# Patient Record
Sex: MALE | Race: WHITE | NOT HISPANIC OR LATINO | Employment: OTHER | URBAN - METROPOLITAN AREA
[De-identification: names, ages, dates, MRNs, and addresses within clinical notes are randomized per-mention and may not be internally consistent; named-entity substitution may affect disease eponyms.]

---

## 2017-01-03 ENCOUNTER — TELEPHONE (OUTPATIENT)
Dept: SMOKING CESSATION | Facility: CLINIC | Age: 47
End: 2017-01-03

## 2017-01-03 NOTE — TELEPHONE ENCOUNTER
Called pt to make appointment with smoking cessation program. No answer so left message with call back number.

## 2017-01-11 ENCOUNTER — PATIENT MESSAGE (OUTPATIENT)
Dept: FAMILY MEDICINE | Facility: CLINIC | Age: 47
End: 2017-01-11

## 2017-01-11 DIAGNOSIS — D22.9 NUMEROUS MOLES: Primary | ICD-10-CM

## 2017-01-12 NOTE — TELEPHONE ENCOUNTER
Patient requesting referral to Dermatology to have 3 new moles assessed.  Please advise.     Advised patient that he may have to call insurance to help find a provider.        Message sent via [facility].

## 2017-01-19 ENCOUNTER — PATIENT MESSAGE (OUTPATIENT)
Dept: FAMILY MEDICINE | Facility: CLINIC | Age: 47
End: 2017-01-19

## 2017-01-23 ENCOUNTER — TELEPHONE (OUTPATIENT)
Dept: NEUROSURGERY | Facility: CLINIC | Age: 47
End: 2017-01-23

## 2017-01-23 NOTE — TELEPHONE ENCOUNTER
Left voice message advising patient 2 week post op appointment on Thursday 1/26/17 cancelled. This appointment not needed since he did not have surgery. Call back number provided for questions or concerns.

## 2017-02-13 DIAGNOSIS — F41.9 ANXIETY: ICD-10-CM

## 2017-02-13 RX ORDER — ALPRAZOLAM 1 MG/1
TABLET ORAL
Qty: 60 TABLET | Refills: 1 | Status: SHIPPED | OUTPATIENT
Start: 2017-02-13 | End: 2017-04-13 | Stop reason: SDUPTHER

## 2017-02-28 DIAGNOSIS — F41.9 ANXIETY: ICD-10-CM

## 2017-03-01 RX ORDER — CITALOPRAM 20 MG/1
TABLET, FILM COATED ORAL
Qty: 90 TABLET | Refills: 0 | Status: SHIPPED | OUTPATIENT
Start: 2017-03-01 | End: 2017-05-28 | Stop reason: SDUPTHER

## 2017-03-13 ENCOUNTER — INITIAL CONSULT (OUTPATIENT)
Dept: DERMATOLOGY | Facility: CLINIC | Age: 47
End: 2017-03-13
Payer: OTHER GOVERNMENT

## 2017-03-13 VITALS — WEIGHT: 215 LBS | BODY MASS INDEX: 27.6 KG/M2

## 2017-03-13 DIAGNOSIS — L81.4 LENTIGINES: ICD-10-CM

## 2017-03-13 DIAGNOSIS — Z87.898 HISTORY OF ATYPICAL NEVUS: ICD-10-CM

## 2017-03-13 DIAGNOSIS — R20.9 DISTURBANCE OF SKIN SENSATION: ICD-10-CM

## 2017-03-13 DIAGNOSIS — L82.1 SEBORRHEIC KERATOSES: Primary | ICD-10-CM

## 2017-03-13 PROCEDURE — 99212 OFFICE O/P EST SF 10 MIN: CPT | Mod: PBBFAC,PO | Performed by: DERMATOLOGY

## 2017-03-13 PROCEDURE — 11310 SHAVE SKIN LESION 0.5 CM/<: CPT | Mod: PBBFAC,PO | Performed by: DERMATOLOGY

## 2017-03-13 PROCEDURE — 99213 OFFICE O/P EST LOW 20 MIN: CPT | Mod: 25,S$PBB,, | Performed by: DERMATOLOGY

## 2017-03-13 PROCEDURE — 11310 SHAVE SKIN LESION 0.5 CM/<: CPT | Mod: S$PBB,,, | Performed by: DERMATOLOGY

## 2017-03-13 PROCEDURE — 99999 PR PBB SHADOW E&M-EST. PATIENT-LVL II: CPT | Mod: PBBFAC,,, | Performed by: DERMATOLOGY

## 2017-03-13 RX ORDER — NALOXEGOL OXALATE 25 MG/1
25 TABLET, FILM COATED ORAL DAILY
COMMUNITY
Start: 2017-01-23

## 2017-03-13 RX ORDER — CHLORZOXAZONE 750 MG/1
TABLET ORAL
COMMUNITY
Start: 2017-01-20 | End: 2020-07-29

## 2017-03-13 NOTE — LETTER
March 13, 2017      Cristina Reeves MD  [address]  [place] [address]           [place] [facility]  [address] [facility]  [place] [address]  Phone: [phone]  Fax: [phone]          Patient: Moreno Avitia   MR Number: [id number]   YOB: 1970   Date of Visit: 3/13/2017       Dear Dr. Cristina Reeves:    Thank you for referring Moreno Avitia to me for evaluation. Attached you will find relevant portions of my assessment and plan of care.    If you have questions, please do not hesitate to call me. I look forward to following Moreno Avitia along with you.    Sincerely,    Yessy Avalos MD    Enclosure  CC:  No Recipients    If you would like to receive this communication electronically, please contact [email or web address] or [phone] to request more information on [employer] access.    For providers and/or their staff who would like to refer a patient to [facility], please contact us through our one-stop-shop provider referral line, [facility], at [phone].    If you feel you have received this communication in error or would no longer like to receive these types of communications, please e-mail [email or web address]

## 2017-03-13 NOTE — PROGRESS NOTES
"  Subjective:       Patient ID:  Moreno Avitia is a 47 y.o. male who presents for   Chief Complaint   Patient presents with    Mole    Skin Check     UBSE     HPI Comments: History of Present Illness: The patient presents with chief complaint of lesion.  Location: left cheek  Duration: months   Signs/Symptoms: bleeds    Prior treatments: LN2            Mole         Review of Systems   Constitutional: Negative for fever.   Skin: Negative for itching and rash.   Hematologic/Lymphatic: Does not bruise/bleed easily.        Objective:    Physical Exam   Constitutional: He appears well-developed and well-nourished. No distress.   Neurological: He is alert and oriented to person, place, and time. He is not disoriented.   Psychiatric: He has a normal mood and affect.   Skin:   Areas Examined (abnormalities noted in diagram):   Head / Face Inspection Performed  Neck Inspection Performed  Chest / Axilla Inspection Performed  Abdomen Inspection Performed  Back Inspection Performed  RUE Inspected  LUE Inspection Performed                   Diagram Legend         Surgical scar with no sign of atypical nevus recurrence       Erythematous-base heme-crusted tan verrucoid plaque consistent with inflamed seborrheic keratosis        Assessment / Plan:        Seborrheic keratoses  Reassurance  Left cheek  Shave removal performed after verbal consent including risk of infection, scar, recurrence, need for additional treatment of site. Area prepped with alcohol, anesthetized 1% lidocaine with epinephrine. Lesional tissue shaved and cauterized. Lesion defect size 4mm No complications. Dressing applied. Wound care explained. Not sent for path          Lentigines  The "ABCD" rules to observe pigmented lesions were reviewed.      History of atypical nevus  Comments:  mild atypia mid back no recurrence                 One year return  "

## 2017-04-13 DIAGNOSIS — F41.9 ANXIETY: ICD-10-CM

## 2017-04-13 RX ORDER — ALPRAZOLAM 1 MG/1
TABLET ORAL
Qty: 60 TABLET | Refills: 0 | Status: SHIPPED | OUTPATIENT
Start: 2017-04-13 | End: 2017-04-24 | Stop reason: SDUPTHER

## 2017-04-24 ENCOUNTER — OFFICE VISIT (OUTPATIENT)
Dept: FAMILY MEDICINE | Facility: CLINIC | Age: 47
End: 2017-04-24
Payer: OTHER GOVERNMENT

## 2017-04-24 VITALS
OXYGEN SATURATION: 98 % | HEIGHT: 74 IN | TEMPERATURE: 99 F | WEIGHT: 206.56 LBS | DIASTOLIC BLOOD PRESSURE: 80 MMHG | BODY MASS INDEX: 26.51 KG/M2 | HEART RATE: 80 BPM | SYSTOLIC BLOOD PRESSURE: 110 MMHG

## 2017-04-24 DIAGNOSIS — E78.5 HYPERLIPIDEMIA, UNSPECIFIED HYPERLIPIDEMIA TYPE: ICD-10-CM

## 2017-04-24 DIAGNOSIS — F41.9 ANXIETY: ICD-10-CM

## 2017-04-24 PROCEDURE — 99213 OFFICE O/P EST LOW 20 MIN: CPT | Mod: S$PBB,,, | Performed by: FAMILY MEDICINE

## 2017-04-24 PROCEDURE — 99213 OFFICE O/P EST LOW 20 MIN: CPT | Mod: PBBFAC,PO | Performed by: FAMILY MEDICINE

## 2017-04-24 PROCEDURE — 99999 PR PBB SHADOW E&M-EST. PATIENT-LVL III: CPT | Mod: PBBFAC,,, | Performed by: FAMILY MEDICINE

## 2017-04-24 RX ORDER — ATORVASTATIN CALCIUM 80 MG/1
80 TABLET, FILM COATED ORAL DAILY
Qty: 90 TABLET | Refills: 1 | Status: SHIPPED | OUTPATIENT
Start: 2017-04-24 | End: 2017-11-13 | Stop reason: SDUPTHER

## 2017-04-24 RX ORDER — ALPRAZOLAM 1 MG/1
1 TABLET ORAL 2 TIMES DAILY
Qty: 60 TABLET | Refills: 2 | Status: SHIPPED | OUTPATIENT
Start: 2017-04-24 | End: 2017-08-11 | Stop reason: SDUPTHER

## 2017-04-24 NOTE — PROGRESS NOTES
Answers for HPI/ROS submitted by the patient on 4/22/2017   neck pain: Yes, No  unexpected weight change: No  hearing loss: No  rhinorrhea: No  trouble swallowing: No  eye discharge: No  visual disturbance: No  chest tightness: No  wheezing: No  chest pain: No  palpitations: Yes  blood in stool: No  constipation: No  vomiting: No  diarrhea: No  polydipsia: No  polyuria: No  difficulty urinating: No  urgency: No  hematuria: No  joint swelling: Yes  arthralgias: Yes  headaches: No  weakness: No  confusion: No  dysphoric mood: Yes    Answers for HPI/ROS submitted by the patient on 4/22/2017   neck pain: Yes, No  unexpected weight change: No  hearing loss: No  rhinorrhea: No  trouble swallowing: No  eye discharge: No  visual disturbance: No  chest tightness: No  wheezing: No  chest pain: No  palpitations: Yes  blood in stool: No  constipation: No  vomiting: No  diarrhea: No  polydipsia: No  polyuria: No  difficulty urinating: No  urgency: No  hematuria: No  joint swelling: Yes  arthralgias: Yes  headaches: No  weakness: No  confusion: No  dysphoric mood: Yes

## 2017-04-24 NOTE — PROGRESS NOTES
"Subjective:       Patient ID: Moreno Avitia is a 47 y.o. male.    Chief Complaint: Renew Medications and Labs Only    HPI Comments: Patient presents for refills of his Ativan. He is due for refills. He states he is doing well. His wife states he is grumpy when he doesn't take it.    He has hyperlipidemia. He is due for labs. He denies side effects from his medication, lipitor.     Review of Systems   Constitutional: Negative for activity change and unexpected weight change.   HENT: Negative for hearing loss, rhinorrhea and trouble swallowing.    Eyes: Negative for discharge and visual disturbance.   Respiratory: Negative for chest tightness and wheezing.    Cardiovascular: Positive for palpitations. Negative for chest pain.   Gastrointestinal: Negative for blood in stool, constipation, diarrhea and vomiting.   Endocrine: Negative for polydipsia and polyuria.   Genitourinary: Negative for difficulty urinating, hematuria and urgency.   Musculoskeletal: Positive for arthralgias and joint swelling.   Neurological: Negative for weakness and headaches.   Psychiatric/Behavioral: Positive for dysphoric mood. Negative for confusion.       Objective:       Vitals:    04/24/17 1330   BP: 110/80   Pulse: 80   Temp: 98.5 °F (36.9 °C)   TempSrc: Oral   SpO2: 98%   Weight: 93.7 kg (206 lb 9.1 oz)   Height: 6' 2" (1.88 m)       Physical Exam   Constitutional: He is oriented to person, place, and time. He appears well-developed and well-nourished. No distress.   HENT:   Head: Normocephalic and atraumatic.   Eyes: Conjunctivae are normal.   Neck: Normal range of motion. Neck supple. Carotid bruit is not present.   Cardiovascular: Normal rate, regular rhythm and normal heart sounds.  Exam reveals no gallop and no friction rub.    No murmur heard.  Pulmonary/Chest: Effort normal and breath sounds normal. No respiratory distress. He has no wheezes. He has no rales.   Musculoskeletal: He exhibits no edema.   Lymphadenopathy:     He " has no cervical adenopathy.   Neurological: He is alert and oriented to person, place, and time.   Skin: He is not diaphoretic.   Psychiatric: He has a normal mood and affect.       Assessment:       1. Anxiety    2. Hyperlipidemia, unspecified hyperlipidemia type        Plan:       Moreno was seen today for renew medications and labs only.    Diagnoses and all orders for this visit:    Anxiety  -     alprazolam (XANAX) 1 MG tablet; Take 1 tablet (1 mg total) by mouth 2 (two) times daily. For anixiety.    Hyperlipidemia, unspecified hyperlipidemia type  -     atorvastatin (LIPITOR) 80 MG tablet; Take 1 tablet (80 mg total) by mouth once daily.  -     Lipid panel; Future  -     TSH; Future  -     Comprehensive metabolic panel; Future

## 2017-04-25 ENCOUNTER — LAB VISIT (OUTPATIENT)
Dept: LAB | Facility: HOSPITAL | Age: 47
End: 2017-04-25
Attending: FAMILY MEDICINE
Payer: OTHER GOVERNMENT

## 2017-04-25 DIAGNOSIS — E78.5 HYPERLIPIDEMIA, UNSPECIFIED HYPERLIPIDEMIA TYPE: ICD-10-CM

## 2017-04-25 LAB
ALBUMIN SERPL BCP-MCNC: 3.7 G/DL
ALP SERPL-CCNC: 54 U/L
ALT SERPL W/O P-5'-P-CCNC: 17 U/L
ANION GAP SERPL CALC-SCNC: 7 MMOL/L
AST SERPL-CCNC: 17 U/L
BILIRUB SERPL-MCNC: 0.6 MG/DL
BUN SERPL-MCNC: 21 MG/DL
CALCIUM SERPL-MCNC: 9.2 MG/DL
CHLORIDE SERPL-SCNC: 107 MMOL/L
CHOLEST/HDLC SERPL: 5.4 {RATIO}
CO2 SERPL-SCNC: 25 MMOL/L
CREAT SERPL-MCNC: 0.9 MG/DL
EST. GFR  (AFRICAN AMERICAN): >60 ML/MIN/1.73 M^2
EST. GFR  (NON AFRICAN AMERICAN): >60 ML/MIN/1.73 M^2
GLUCOSE SERPL-MCNC: 105 MG/DL
HDL/CHOLESTEROL RATIO: 18.4 %
HDLC SERPL-MCNC: 190 MG/DL
HDLC SERPL-MCNC: 35 MG/DL
LDLC SERPL CALC-MCNC: 116.6 MG/DL
NONHDLC SERPL-MCNC: 155 MG/DL
POTASSIUM SERPL-SCNC: 3.9 MMOL/L
PROT SERPL-MCNC: 6.5 G/DL
SODIUM SERPL-SCNC: 139 MMOL/L
TRIGL SERPL-MCNC: 192 MG/DL
TSH SERPL DL<=0.005 MIU/L-ACNC: 0.6 UIU/ML

## 2017-04-25 PROCEDURE — 36415 COLL VENOUS BLD VENIPUNCTURE: CPT

## 2017-04-25 PROCEDURE — 80053 COMPREHEN METABOLIC PANEL: CPT

## 2017-04-25 PROCEDURE — 84443 ASSAY THYROID STIM HORMONE: CPT

## 2017-04-25 PROCEDURE — 80061 LIPID PANEL: CPT

## 2017-05-05 ENCOUNTER — HOSPITAL ENCOUNTER (OUTPATIENT)
Dept: RADIOLOGY | Facility: HOSPITAL | Age: 47
Discharge: HOME | End: 2017-05-05
Attending: PAIN MEDICINE
Payer: OTHER GOVERNMENT

## 2017-05-05 DIAGNOSIS — M25.519 PAIN IN JOINT, SHOULDER REGION: ICD-10-CM

## 2017-05-05 DIAGNOSIS — M25.519 PAIN IN JOINT, SHOULDER REGION: Primary | ICD-10-CM

## 2017-05-05 PROCEDURE — 73030 X-RAY EXAM OF SHOULDER: CPT | Mod: TC,RT

## 2017-05-05 PROCEDURE — 73030 X-RAY EXAM OF SHOULDER: CPT | Mod: TC,LT

## 2017-05-05 PROCEDURE — 73030 X-RAY EXAM OF SHOULDER: CPT | Mod: 26,LT,, | Performed by: RADIOLOGY

## 2017-05-05 PROCEDURE — 73030 X-RAY EXAM OF SHOULDER: CPT | Mod: 26,RT,, | Performed by: RADIOLOGY

## 2017-05-23 ENCOUNTER — TELEPHONE (OUTPATIENT)
Dept: FAMILY MEDICINE | Facility: CLINIC | Age: 47
End: 2017-05-23

## 2017-05-23 DIAGNOSIS — Z02.71 DISABILITY EXAMINATION: Primary | ICD-10-CM

## 2017-05-23 NOTE — TELEPHONE ENCOUNTER
----- Message from Amanda Mckeon sent at 5/23/2017  9:26 AM CDT -----  Contact: Self  Pt states he needs to be scheduled for a functional capacity test. Pt can be reached @ [phone].

## 2017-05-23 NOTE — TELEPHONE ENCOUNTER
Patient has [employer] disability and has a form from insurance that has to be a functional capacity test . States that he may need a referral and not sure of where to go to have this done. Informed patient that I would send message to provider to see what patient need to do to have this form filled out. Patient verbalized understanding.

## 2017-05-25 ENCOUNTER — PATIENT MESSAGE (OUTPATIENT)
Dept: FAMILY MEDICINE | Facility: CLINIC | Age: 47
End: 2017-05-25

## 2017-05-28 DIAGNOSIS — F41.9 ANXIETY: ICD-10-CM

## 2017-05-29 RX ORDER — CITALOPRAM 20 MG/1
TABLET, FILM COATED ORAL
Qty: 90 TABLET | Refills: 1 | Status: SHIPPED | OUTPATIENT
Start: 2017-05-29 | End: 2017-11-13 | Stop reason: SDUPTHER

## 2017-07-06 DIAGNOSIS — F41.9 ANXIETY: ICD-10-CM

## 2017-07-06 RX ORDER — ALPRAZOLAM 1 MG/1
TABLET ORAL
Qty: 60 TABLET | OUTPATIENT
Start: 2017-07-06

## 2017-08-03 ENCOUNTER — CLINICAL SUPPORT (OUTPATIENT)
Dept: SMOKING CESSATION | Facility: CLINIC | Age: 47
End: 2017-08-03
Payer: COMMERCIAL

## 2017-08-03 DIAGNOSIS — F17.200 NICOTINE DEPENDENCE: Primary | ICD-10-CM

## 2017-08-03 PROCEDURE — 99407 BEHAV CHNG SMOKING > 10 MIN: CPT | Mod: S$GLB,,,

## 2017-08-03 NOTE — PROGRESS NOTES
Pt not ready to return to the program at this time due to hospitalization of son. Pt will call to schedule when ready. Will resolve this episode.

## 2017-08-08 ENCOUNTER — PATIENT MESSAGE (OUTPATIENT)
Dept: FAMILY MEDICINE | Facility: CLINIC | Age: 47
End: 2017-08-08

## 2017-08-08 DIAGNOSIS — F41.9 ANXIETY: ICD-10-CM

## 2017-08-11 ENCOUNTER — OFFICE VISIT (OUTPATIENT)
Dept: FAMILY MEDICINE | Facility: CLINIC | Age: 47
End: 2017-08-11
Payer: OTHER GOVERNMENT

## 2017-08-11 VITALS
WEIGHT: 204.81 LBS | OXYGEN SATURATION: 97 % | HEART RATE: 60 BPM | DIASTOLIC BLOOD PRESSURE: 80 MMHG | SYSTOLIC BLOOD PRESSURE: 110 MMHG | BODY MASS INDEX: 26.28 KG/M2 | HEIGHT: 74 IN

## 2017-08-11 DIAGNOSIS — F41.9 ANXIETY: ICD-10-CM

## 2017-08-11 PROCEDURE — 99999 PR PBB SHADOW E&M-EST. PATIENT-LVL III: CPT | Mod: PBBFAC,,, | Performed by: FAMILY MEDICINE

## 2017-08-11 PROCEDURE — 3008F BODY MASS INDEX DOCD: CPT | Mod: ,,, | Performed by: FAMILY MEDICINE

## 2017-08-11 PROCEDURE — 99213 OFFICE O/P EST LOW 20 MIN: CPT | Mod: PBBFAC,PO | Performed by: FAMILY MEDICINE

## 2017-08-11 PROCEDURE — 99213 OFFICE O/P EST LOW 20 MIN: CPT | Mod: S$PBB,,, | Performed by: FAMILY MEDICINE

## 2017-08-11 RX ORDER — ALPRAZOLAM 1 MG/1
1 TABLET ORAL 2 TIMES DAILY
Qty: 60 TABLET | Refills: 2 | Status: SHIPPED | OUTPATIENT
Start: 2017-08-11 | End: 2017-11-16 | Stop reason: SDUPTHER

## 2017-08-11 RX ORDER — DICLOFENAC SODIUM 16.05 MG/ML
SOLUTION TOPICAL
Refills: 0 | COMMUNITY
Start: 2017-07-07

## 2017-08-11 NOTE — PROGRESS NOTES
"Subjective:       Patient ID: Moreno Avitia is a 47 y.o. male.    Chief Complaint: Renew Meds and Discuss Cholesterol Medication    Patient presents for anxiety. He states his stepson was hospitalized with respiratory failure. He states he is being weaned off the oxygen at home. It has been a strain on him and his family due to the illness. He uses xanax to cope with the stress and uses this as needed. He had this last filled in April 2017.       Review of Systems   Respiratory: Negative for chest tightness and shortness of breath.    Cardiovascular: Negative for chest pain and palpitations.       Objective:       Vitals:    08/11/17 1008   BP: 110/80   Pulse: 60   SpO2: 97%   Weight: 92.9 kg (204 lb 12.9 oz)   Height: 6' 2" (1.88 m)       Physical Exam   Constitutional: He is oriented to person, place, and time. He appears well-developed and well-nourished. No distress.   HENT:   Head: Normocephalic and atraumatic.   Eyes: Conjunctivae are normal.   Neck: Normal range of motion. Neck supple.   Cardiovascular: Normal rate, regular rhythm and normal heart sounds.  Exam reveals no gallop and no friction rub.    No murmur heard.  Pulmonary/Chest: Effort normal and breath sounds normal. No respiratory distress. He has no wheezes. He has no rales.   Neurological: He is alert and oriented to person, place, and time.   Skin: He is not diaphoretic.       Assessment:       1. Anxiety        Plan:       Moreno was seen today for renew meds and discuss cholesterol medication.    Diagnoses and all orders for this visit:    Anxiety  -     alprazolam (XANAX) 1 MG tablet; Take 1 tablet (1 mg total) by mouth 2 (two) times daily. For anixiety.           "

## 2017-09-06 ENCOUNTER — PATIENT MESSAGE (OUTPATIENT)
Dept: ADMINISTRATIVE | Facility: OTHER | Age: 47
End: 2017-09-06

## 2017-09-14 ENCOUNTER — PATIENT MESSAGE (OUTPATIENT)
Dept: FAMILY MEDICINE | Facility: CLINIC | Age: 47
End: 2017-09-14

## 2017-09-14 DIAGNOSIS — M79.2 NEURITIS: ICD-10-CM

## 2017-09-14 DIAGNOSIS — M47.26 OSTEOARTHRITIS OF SPINE WITH RADICULOPATHY, LUMBAR REGION: Primary | ICD-10-CM

## 2017-09-21 ENCOUNTER — TELEPHONE (OUTPATIENT)
Dept: FAMILY MEDICINE | Facility: CLINIC | Age: 47
End: 2017-09-21

## 2017-10-13 DIAGNOSIS — F41.9 ANXIETY: ICD-10-CM

## 2017-10-16 RX ORDER — ALPRAZOLAM 1 MG/1
TABLET ORAL
Qty: 60 TABLET | OUTPATIENT
Start: 2017-10-16

## 2017-11-13 ENCOUNTER — PATIENT MESSAGE (OUTPATIENT)
Dept: FAMILY MEDICINE | Facility: CLINIC | Age: 47
End: 2017-11-13

## 2017-11-13 DIAGNOSIS — F41.9 ANXIETY: ICD-10-CM

## 2017-11-13 DIAGNOSIS — E78.5 HYPERLIPIDEMIA, UNSPECIFIED HYPERLIPIDEMIA TYPE: ICD-10-CM

## 2017-11-13 RX ORDER — ALPRAZOLAM 1 MG/1
1 TABLET ORAL 2 TIMES DAILY
Qty: 60 TABLET | Refills: 2 | OUTPATIENT
Start: 2017-11-13

## 2017-11-14 RX ORDER — ATORVASTATIN CALCIUM 80 MG/1
80 TABLET, FILM COATED ORAL DAILY
Qty: 90 TABLET | Refills: 1 | Status: SHIPPED | OUTPATIENT
Start: 2017-11-14 | End: 2018-04-20 | Stop reason: SDUPTHER

## 2017-11-14 RX ORDER — ALPRAZOLAM 1 MG/1
1 TABLET ORAL 2 TIMES DAILY
Qty: 60 TABLET | Refills: 2 | OUTPATIENT
Start: 2017-11-14

## 2017-11-14 RX ORDER — CITALOPRAM 20 MG/1
20 TABLET, FILM COATED ORAL DAILY
Qty: 90 TABLET | Refills: 1 | Status: SHIPPED | OUTPATIENT
Start: 2017-11-14 | End: 2017-11-25 | Stop reason: SDUPTHER

## 2017-11-14 NOTE — TELEPHONE ENCOUNTER
Patient informed of refill denial - xanax; office visit needed.  Patient verbalized understanding, scheduled appointment for 11/16/2017.

## 2017-11-16 ENCOUNTER — OFFICE VISIT (OUTPATIENT)
Dept: FAMILY MEDICINE | Facility: CLINIC | Age: 47
End: 2017-11-16
Payer: MEDICARE

## 2017-11-16 ENCOUNTER — LAB VISIT (OUTPATIENT)
Dept: LAB | Facility: HOSPITAL | Age: 47
End: 2017-11-16
Attending: FAMILY MEDICINE
Payer: MEDICARE

## 2017-11-16 VITALS
WEIGHT: 205 LBS | OXYGEN SATURATION: 97 % | TEMPERATURE: 98 F | DIASTOLIC BLOOD PRESSURE: 70 MMHG | HEART RATE: 80 BPM | HEIGHT: 74 IN | SYSTOLIC BLOOD PRESSURE: 120 MMHG | BODY MASS INDEX: 26.31 KG/M2

## 2017-11-16 DIAGNOSIS — M54.50 MIDLINE LOW BACK PAIN WITHOUT SCIATICA, UNSPECIFIED CHRONICITY: ICD-10-CM

## 2017-11-16 DIAGNOSIS — E78.5 HYPERLIPIDEMIA, UNSPECIFIED HYPERLIPIDEMIA TYPE: Primary | ICD-10-CM

## 2017-11-16 DIAGNOSIS — G89.4 CHRONIC PAIN SYNDROME: ICD-10-CM

## 2017-11-16 DIAGNOSIS — E78.5 HYPERLIPIDEMIA, UNSPECIFIED HYPERLIPIDEMIA TYPE: ICD-10-CM

## 2017-11-16 DIAGNOSIS — F41.9 ANXIETY: ICD-10-CM

## 2017-11-16 LAB
ALBUMIN SERPL BCP-MCNC: 3.5 G/DL
ALP SERPL-CCNC: 53 U/L
ALT SERPL W/O P-5'-P-CCNC: 12 U/L
ANION GAP SERPL CALC-SCNC: 7 MMOL/L
AST SERPL-CCNC: 17 U/L
BASOPHILS # BLD AUTO: 0.03 K/UL
BASOPHILS NFR BLD: 0.5 %
BILIRUB SERPL-MCNC: 0.4 MG/DL
BUN SERPL-MCNC: 20 MG/DL
BZO OXAZEPAM 300 NG/ML: POSITIVE
CALCIUM SERPL-MCNC: 9.2 MG/DL
CHLORIDE SERPL-SCNC: 109 MMOL/L
CHOLEST SERPL-MCNC: 207 MG/DL
CHOLEST/HDLC SERPL: 5 {RATIO}
CO2 SERPL-SCNC: 26 MMOL/L
CREAT SERPL-MCNC: 0.8 MG/DL
CTP QC/QA: YES
DIFFERENTIAL METHOD: ABNORMAL
EOSINOPHIL # BLD AUTO: 0.4 K/UL
EOSINOPHIL NFR BLD: 6 %
ERYTHROCYTE [DISTWIDTH] IN BLOOD BY AUTOMATED COUNT: 12.6 %
EST. GFR  (AFRICAN AMERICAN): >60 ML/MIN/1.73 M^2
EST. GFR  (NON AFRICAN AMERICAN): >60 ML/MIN/1.73 M^2
GLUCOSE SERPL-MCNC: 105 MG/DL
HCT VFR BLD AUTO: 42.7 %
HDLC SERPL-MCNC: 41 MG/DL
HDLC SERPL: 19.8 %
HGB BLD-MCNC: 14.9 G/DL
LDLC SERPL CALC-MCNC: 137.4 MG/DL
LYMPHOCYTES # BLD AUTO: 1.7 K/UL
LYMPHOCYTES NFR BLD: 27.4 %
MCH RBC QN AUTO: 31.7 PG
MCHC RBC AUTO-ENTMCNC: 34.9 G/DL
MCV RBC AUTO: 91 FL
MONOCYTES # BLD AUTO: 0.6 K/UL
MONOCYTES NFR BLD: 9.4 %
NEUTROPHILS # BLD AUTO: 3.6 K/UL
NEUTROPHILS NFR BLD: 56.7 %
NONHDLC SERPL-MCNC: 166 MG/DL
OXYCODONE,URINE, POC: POSITIVE
PLATELET # BLD AUTO: 248 K/UL
PMV BLD AUTO: 10.9 FL
POC 10 PANEL DRUG SCREEN: NEGATIVE
POTASSIUM SERPL-SCNC: 4.2 MMOL/L
PROT SERPL-MCNC: 6.5 G/DL
RBC # BLD AUTO: 4.7 M/UL
SODIUM SERPL-SCNC: 142 MMOL/L
TRIGL SERPL-MCNC: 143 MG/DL
TSH SERPL DL<=0.005 MIU/L-ACNC: 0.53 UIU/ML
WBC # BLD AUTO: 6.36 K/UL

## 2017-11-16 PROCEDURE — 80305 DRUG TEST PRSMV DIR OPT OBS: CPT | Mod: PBBFAC,PO | Performed by: FAMILY MEDICINE

## 2017-11-16 PROCEDURE — 99999 PR PBB SHADOW E&M-EST. PATIENT-LVL III: CPT | Mod: PBBFAC,,, | Performed by: FAMILY MEDICINE

## 2017-11-16 PROCEDURE — 84443 ASSAY THYROID STIM HORMONE: CPT

## 2017-11-16 PROCEDURE — 80061 LIPID PANEL: CPT

## 2017-11-16 PROCEDURE — 85025 COMPLETE CBC W/AUTO DIFF WBC: CPT

## 2017-11-16 PROCEDURE — 80053 COMPREHEN METABOLIC PANEL: CPT

## 2017-11-16 PROCEDURE — 36415 COLL VENOUS BLD VENIPUNCTURE: CPT

## 2017-11-16 PROCEDURE — 99213 OFFICE O/P EST LOW 20 MIN: CPT | Mod: S$PBB,,, | Performed by: FAMILY MEDICINE

## 2017-11-16 PROCEDURE — 99213 OFFICE O/P EST LOW 20 MIN: CPT | Mod: PBBFAC,PO | Performed by: FAMILY MEDICINE

## 2017-11-16 RX ORDER — ALPRAZOLAM 1 MG/1
1 TABLET ORAL 2 TIMES DAILY
Qty: 60 TABLET | Refills: 2 | Status: SHIPPED | OUTPATIENT
Start: 2017-11-16 | End: 2018-02-15 | Stop reason: SDUPTHER

## 2017-11-16 NOTE — PROGRESS NOTES
"Subjective:       Patient ID: Moreno Avitia is a 47 y.o. male.    Chief Complaint: Renew Meds    Patient presents for followup. He is doing well on his xanax. His mood is stable. He states his step son is doing much better and is eating regularly. He is at home on a ventilator. Things are settling and the stress level is decreasing.       Review of Systems   Respiratory: Negative for chest tightness and shortness of breath.    Cardiovascular: Negative for chest pain and palpitations.   Psychiatric/Behavioral: Negative for agitation, decreased concentration and dysphoric mood. The patient is nervous/anxious.        Objective:       Vitals:    11/16/17 0854   BP: 120/70   Pulse: 80   Temp: 98.3 °F (36.8 °C)   TempSrc: Oral   SpO2: 97%   Weight: 93 kg (205 lb 0.4 oz)   Height: 6' 2" (1.88 m)        Physical Exam   Constitutional: He is oriented to person, place, and time. He appears well-developed and well-nourished. No distress.   HENT:   Head: Normocephalic and atraumatic.   Eyes: Conjunctivae are normal.   Neck: Normal range of motion. Neck supple. Carotid bruit is not present.   Cardiovascular: Normal rate, regular rhythm and normal heart sounds.  Exam reveals no gallop and no friction rub.    No murmur heard.  Pulmonary/Chest: Effort normal and breath sounds normal. No respiratory distress. He has no wheezes. He has no rales. He exhibits no tenderness.   Musculoskeletal: He exhibits no edema.   Lymphadenopathy:     He has no cervical adenopathy.   Neurological: He is alert and oriented to person, place, and time. He displays no tremor.   Skin: He is not diaphoretic.   Psychiatric: He has a normal mood and affect.       Assessment:       1. Hyperlipidemia, unspecified hyperlipidemia type    2. Anxiety    3. Chronic pain syndrome    4. Midline low back pain without sciatica, unspecified chronicity         Plan:       Moreno was seen today for renew meds.    Diagnoses and all orders for this " visit:    Hyperlipidemia, unspecified hyperlipidemia type  -     Lipid panel; Future  -     CBC auto differential; Future  -     Comprehensive metabolic panel; Future  -     TSH; Future  Due for labs    Anxiety  -     ALPRAZolam (XANAX) 1 MG tablet; Take 1 tablet (1 mg total) by mouth 2 (two) times daily. For anixiety.  -     Toxicology screen, urine; Future  Stable. Refilled meds and due for labs    Chronic pain syndrome  -     POCT Rapid Drug Screen 10 Panel    Midline low back pain without sciatica, unspecified chronicity   -     POCT Rapid Drug Screen 10 Panel

## 2017-11-25 DIAGNOSIS — F41.9 ANXIETY: ICD-10-CM

## 2017-11-26 RX ORDER — CITALOPRAM 20 MG/1
TABLET, FILM COATED ORAL
Qty: 90 TABLET | Refills: 1 | Status: SHIPPED | OUTPATIENT
Start: 2017-11-26 | End: 2018-07-09 | Stop reason: SDUPTHER

## 2018-01-15 DIAGNOSIS — F41.9 ANXIETY: ICD-10-CM

## 2018-01-16 RX ORDER — ALPRAZOLAM 1 MG/1
TABLET ORAL
Qty: 60 TABLET | OUTPATIENT
Start: 2018-01-16

## 2018-02-15 ENCOUNTER — OFFICE VISIT (OUTPATIENT)
Dept: FAMILY MEDICINE | Facility: CLINIC | Age: 48
End: 2018-02-15
Payer: MEDICARE

## 2018-02-15 VITALS
HEIGHT: 74 IN | TEMPERATURE: 98 F | HEART RATE: 67 BPM | OXYGEN SATURATION: 97 % | WEIGHT: 203.69 LBS | BODY MASS INDEX: 26.14 KG/M2 | SYSTOLIC BLOOD PRESSURE: 110 MMHG | DIASTOLIC BLOOD PRESSURE: 78 MMHG

## 2018-02-15 DIAGNOSIS — F41.9 ANXIETY: ICD-10-CM

## 2018-02-15 DIAGNOSIS — J20.9 ACUTE BRONCHITIS, UNSPECIFIED ORGANISM: Primary | ICD-10-CM

## 2018-02-15 PROCEDURE — 99999 PR PBB SHADOW E&M-EST. PATIENT-LVL III: CPT | Mod: PBBFAC,,, | Performed by: FAMILY MEDICINE

## 2018-02-15 PROCEDURE — 99213 OFFICE O/P EST LOW 20 MIN: CPT | Mod: PBBFAC,PO | Performed by: FAMILY MEDICINE

## 2018-02-15 PROCEDURE — 99214 OFFICE O/P EST MOD 30 MIN: CPT | Mod: S$PBB,,, | Performed by: FAMILY MEDICINE

## 2018-02-15 RX ORDER — ALPRAZOLAM 1 MG/1
1 TABLET ORAL 2 TIMES DAILY
Qty: 60 TABLET | Refills: 2 | Status: SHIPPED | OUTPATIENT
Start: 2018-02-15 | End: 2018-05-18 | Stop reason: SDUPTHER

## 2018-02-15 RX ORDER — AZITHROMYCIN 250 MG/1
TABLET, FILM COATED ORAL
Qty: 6 TABLET | Refills: 0 | Status: SHIPPED | OUTPATIENT
Start: 2018-02-15 | End: 2018-02-20

## 2018-02-15 NOTE — PROGRESS NOTES
"Subjective:       Patient ID: Moreno Avitia is a 47 y.o. male.    Chief Complaint: Cough; Anxiety; Follow-up; and Medication Refill    Anxiety   Presents for follow-up visit. Symptoms include excessive worry and nervous/anxious behavior. Patient reports no chest pain, depressed mood, palpitations, panic or shortness of breath. Symptoms occur constantly. The quality of sleep is good. Nighttime awakenings: none.     Compliance with medications is 51-75%.   Cough   This is a new problem. The current episode started 1 to 4 weeks ago (10 days). The problem has been unchanged. The cough is productive of purulent sputum and productive of brown sputum. Associated symptoms include postnasal drip. Pertinent negatives include no chest pain, chills, fever, rhinorrhea, sore throat, shortness of breath or wheezing. He has tried OTC cough suppressant for the symptoms. The treatment provided no relief.     Review of Systems   Constitutional: Negative for chills and fever.   HENT: Positive for postnasal drip. Negative for rhinorrhea and sore throat.    Respiratory: Positive for cough. Negative for shortness of breath and wheezing.    Cardiovascular: Negative for chest pain and palpitations.   Psychiatric/Behavioral: The patient is nervous/anxious.        Objective:       Vitals:    02/15/18 1440   BP: 110/78   Pulse: 67   Temp: 98.2 °F (36.8 °C)   TempSrc: Oral   SpO2: 97%   Weight: 92.4 kg (203 lb 11.3 oz)   Height: 6' 2" (1.88 m)       Physical Exam   Constitutional: He is oriented to person, place, and time. He appears well-developed and well-nourished. No distress.   HENT:   Head: Normocephalic and atraumatic.   Right Ear: External ear normal.   Left Ear: External ear normal.   Mouth/Throat: Oropharynx is clear and moist. No oropharyngeal exudate.   Neck: Normal range of motion. Neck supple.   Cardiovascular: Normal rate, regular rhythm and normal heart sounds.  Exam reveals no gallop and no friction rub.    No murmur " heard.  Pulmonary/Chest: Effort normal and breath sounds normal. No respiratory distress. He has no wheezes. He has no rales. He exhibits no tenderness.   Lymphadenopathy:     He has cervical adenopathy.   Neurological: He is alert and oriented to person, place, and time.   Skin: He is not diaphoretic.       Assessment:       1. Acute bronchitis, unspecified organism    2. Anxiety        Plan:       Moreno was seen today for cough, anxiety, follow-up and medication refill.    Diagnoses and all orders for this visit:    Acute bronchitis, unspecified organism  -     azithromycin (Z-TRELL) 250 MG tablet; Take 2 tablets by mouth on day 1; Take 1 tablet by mouth on days 2-5    Anxiety  -     ALPRAZolam (XANAX) 1 MG tablet; Take 1 tablet (1 mg total) by mouth 2 (two) times daily. For anixiety.

## 2018-04-10 DIAGNOSIS — F41.9 ANXIETY: ICD-10-CM

## 2018-04-11 RX ORDER — ALPRAZOLAM 1 MG/1
TABLET ORAL
Qty: 60 TABLET | OUTPATIENT
Start: 2018-04-11

## 2018-04-19 ENCOUNTER — PATIENT MESSAGE (OUTPATIENT)
Dept: FAMILY MEDICINE | Facility: CLINIC | Age: 48
End: 2018-04-19

## 2018-04-19 DIAGNOSIS — E78.5 HYPERLIPIDEMIA, UNSPECIFIED HYPERLIPIDEMIA TYPE: Primary | ICD-10-CM

## 2018-04-20 RX ORDER — ATORVASTATIN CALCIUM 80 MG/1
80 TABLET, FILM COATED ORAL DAILY
Qty: 90 TABLET | Refills: 1 | Status: SHIPPED | OUTPATIENT
Start: 2018-04-20 | End: 2018-07-10 | Stop reason: SDUPTHER

## 2018-05-14 ENCOUNTER — PATIENT MESSAGE (OUTPATIENT)
Dept: FAMILY MEDICINE | Facility: CLINIC | Age: 48
End: 2018-05-14

## 2018-05-14 DIAGNOSIS — F41.9 ANXIETY: ICD-10-CM

## 2018-05-14 RX ORDER — ALPRAZOLAM 1 MG/1
1 TABLET ORAL 2 TIMES DAILY
Qty: 60 TABLET | Refills: 2 | OUTPATIENT
Start: 2018-05-14

## 2018-05-18 ENCOUNTER — OFFICE VISIT (OUTPATIENT)
Dept: FAMILY MEDICINE | Facility: CLINIC | Age: 48
End: 2018-05-18
Payer: MEDICARE

## 2018-05-18 ENCOUNTER — LAB VISIT (OUTPATIENT)
Dept: LAB | Facility: HOSPITAL | Age: 48
End: 2018-05-18
Attending: FAMILY MEDICINE
Payer: MEDICARE

## 2018-05-18 VITALS
DIASTOLIC BLOOD PRESSURE: 74 MMHG | SYSTOLIC BLOOD PRESSURE: 120 MMHG | HEIGHT: 74 IN | BODY MASS INDEX: 25.55 KG/M2 | OXYGEN SATURATION: 97 % | HEART RATE: 73 BPM | WEIGHT: 199.06 LBS

## 2018-05-18 DIAGNOSIS — E78.5 HYPERLIPIDEMIA, UNSPECIFIED HYPERLIPIDEMIA TYPE: Primary | ICD-10-CM

## 2018-05-18 DIAGNOSIS — E78.5 HYPERLIPIDEMIA, UNSPECIFIED HYPERLIPIDEMIA TYPE: ICD-10-CM

## 2018-05-18 DIAGNOSIS — F41.9 ANXIETY: ICD-10-CM

## 2018-05-18 LAB
ALBUMIN SERPL BCP-MCNC: 3.7 G/DL
ALP SERPL-CCNC: 58 U/L
ALT SERPL W/O P-5'-P-CCNC: 16 U/L
ANION GAP SERPL CALC-SCNC: 6 MMOL/L
AST SERPL-CCNC: 15 U/L
BILIRUB SERPL-MCNC: 0.6 MG/DL
BUN SERPL-MCNC: 16 MG/DL
CALCIUM SERPL-MCNC: 9.1 MG/DL
CHLORIDE SERPL-SCNC: 108 MMOL/L
CHOLEST SERPL-MCNC: 181 MG/DL
CHOLEST/HDLC SERPL: 5.2 {RATIO}
CO2 SERPL-SCNC: 27 MMOL/L
CREAT SERPL-MCNC: 0.9 MG/DL
EST. GFR  (AFRICAN AMERICAN): >60 ML/MIN/1.73 M^2
EST. GFR  (NON AFRICAN AMERICAN): >60 ML/MIN/1.73 M^2
GLUCOSE SERPL-MCNC: 99 MG/DL
HDLC SERPL-MCNC: 35 MG/DL
HDLC SERPL: 19.3 %
LDLC SERPL CALC-MCNC: 99.8 MG/DL
NONHDLC SERPL-MCNC: 146 MG/DL
POTASSIUM SERPL-SCNC: 4 MMOL/L
PROT SERPL-MCNC: 6.3 G/DL
SODIUM SERPL-SCNC: 141 MMOL/L
TRIGL SERPL-MCNC: 231 MG/DL
TSH SERPL DL<=0.005 MIU/L-ACNC: 0.6 UIU/ML

## 2018-05-18 PROCEDURE — 99999 PR PBB SHADOW E&M-EST. PATIENT-LVL II: CPT | Mod: PBBFAC,,, | Performed by: FAMILY MEDICINE

## 2018-05-18 PROCEDURE — 80061 LIPID PANEL: CPT

## 2018-05-18 PROCEDURE — 36415 COLL VENOUS BLD VENIPUNCTURE: CPT | Mod: PO

## 2018-05-18 PROCEDURE — 84443 ASSAY THYROID STIM HORMONE: CPT

## 2018-05-18 PROCEDURE — 99213 OFFICE O/P EST LOW 20 MIN: CPT | Mod: S$PBB,,, | Performed by: FAMILY MEDICINE

## 2018-05-18 PROCEDURE — 99212 OFFICE O/P EST SF 10 MIN: CPT | Mod: PBBFAC,PO | Performed by: FAMILY MEDICINE

## 2018-05-18 PROCEDURE — 80053 COMPREHEN METABOLIC PANEL: CPT

## 2018-05-18 RX ORDER — ALPRAZOLAM 1 MG/1
1 TABLET ORAL 2 TIMES DAILY
Qty: 60 TABLET | Refills: 2 | Status: SHIPPED | OUTPATIENT
Start: 2018-05-18 | End: 2018-08-20

## 2018-05-18 NOTE — PROGRESS NOTES
"Subjective:       Patient ID: Moreno Avitia is a 48 y.o. male.    Chief Complaint: Follow Up/ Renew Medications    Hyperlipidemia   This is a chronic problem. The current episode started more than 1 year ago. The problem is controlled. He has no history of diabetes or obesity. Pertinent negatives include no chest pain, focal weakness, leg pain, myalgias or shortness of breath. Current antihyperlipidemic treatment includes statins. The current treatment provides significant improvement of lipids. Risk factors for coronary artery disease include dyslipidemia and male sex.   Sarah is here for refills of xanax. He states  His stress level has improved. He states his step son is doing much better.   Review of Systems   Respiratory: Negative for shortness of breath.    Cardiovascular: Negative for chest pain.   Musculoskeletal: Negative for myalgias.   Neurological: Negative for focal weakness.       Objective:       Vitals:    05/18/18 0849   BP: 120/74   Pulse: 73   SpO2: 97%   Weight: 90.3 kg (199 lb 1.2 oz)   Height: 6' 2" (1.88 m)       Physical Exam   Constitutional: He is oriented to person, place, and time. He appears well-developed and well-nourished. No distress.   HENT:   Head: Normocephalic and atraumatic.   Eyes: Conjunctivae are normal.   Neck: Normal range of motion. Neck supple. No JVD present. Carotid bruit is not present. No thyromegaly present.   Cardiovascular: Normal rate, regular rhythm and normal heart sounds.  Exam reveals no gallop and no friction rub.    No murmur heard.  Pulmonary/Chest: Effort normal and breath sounds normal. No respiratory distress. He has no wheezes. He has no rales.   Musculoskeletal: He exhibits no edema.   Neurological: He is alert and oriented to person, place, and time.   Skin: He is not diaphoretic.       Assessment:       1. Hyperlipidemia, unspecified hyperlipidemia type    2. Anxiety        Plan:       Moreno was seen today for follow up/ renew " medications.    Diagnoses and all orders for this visit:    Hyperlipidemia, unspecified hyperlipidemia type  She has cholesterol labs drawn and it was normal.     Anxiety  -     ALPRAZolam (XANAX) 1 MG tablet; Take 1 tablet (1 mg total) by mouth 2 (two) times daily. For anixiety.  Stable. Refilled meds.

## 2018-05-22 ENCOUNTER — PATIENT MESSAGE (OUTPATIENT)
Dept: FAMILY MEDICINE | Facility: CLINIC | Age: 48
End: 2018-05-22

## 2018-06-04 ENCOUNTER — OFFICE VISIT (OUTPATIENT)
Dept: DERMATOLOGY | Facility: CLINIC | Age: 48
End: 2018-06-04
Payer: MEDICARE

## 2018-06-04 VITALS — BODY MASS INDEX: 25.55 KG/M2 | WEIGHT: 199 LBS

## 2018-06-04 DIAGNOSIS — L81.4 LENTIGINES: ICD-10-CM

## 2018-06-04 DIAGNOSIS — D48.5 NEOPLASM OF UNCERTAIN BEHAVIOR OF SKIN: Primary | ICD-10-CM

## 2018-06-04 DIAGNOSIS — D22.9 NEVUS OF MULTIPLE SITES: ICD-10-CM

## 2018-06-04 DIAGNOSIS — Z87.898 HISTORY OF ATYPICAL NEVUS: ICD-10-CM

## 2018-06-04 PROCEDURE — 99999 PR PBB SHADOW E&M-EST. PATIENT-LVL III: CPT | Mod: PBBFAC,,, | Performed by: DERMATOLOGY

## 2018-06-04 PROCEDURE — 88305 TISSUE EXAM BY PATHOLOGIST: CPT | Performed by: PATHOLOGY

## 2018-06-04 PROCEDURE — 99213 OFFICE O/P EST LOW 20 MIN: CPT | Mod: PBBFAC,PO,25 | Performed by: DERMATOLOGY

## 2018-06-04 PROCEDURE — 99213 OFFICE O/P EST LOW 20 MIN: CPT | Mod: 25,S$PBB,, | Performed by: DERMATOLOGY

## 2018-06-04 PROCEDURE — 11305 SHAVE SKIN LESION 0.5 CM/<: CPT | Mod: S$PBB,,, | Performed by: DERMATOLOGY

## 2018-06-04 PROCEDURE — 11305 SHAVE SKIN LESION 0.5 CM/<: CPT | Mod: PBBFAC,PO | Performed by: DERMATOLOGY

## 2018-06-04 NOTE — PROGRESS NOTES
Subjective:       Patient ID:  Moreno Avitia is a 48 y.o. male who presents for   Chief Complaint   Patient presents with    Lesion     scalp, left ear      History of Present Illness: The patient presents with chief complaint of spot.  Location: scalp  Duration: years  Signs/Symptoms: growing, irritated    Prior treatments: has been removed elsewhere and has recurred          Review of Systems   Constitutional: Negative for fever.   Skin: Negative for itching and rash.   Hematologic/Lymphatic: Does not bruise/bleed easily.        Objective:    Physical Exam   Constitutional: He appears well-developed and well-nourished. No distress.   Neurological: He is alert and oriented to person, place, and time. He is not disoriented.   Psychiatric: He has a normal mood and affect.   Skin:   Areas Examined (abnormalities noted in diagram):   Head / Face Inspection Performed  Neck Inspection Performed  Chest / Axilla Inspection Performed  Abdomen Inspection Performed  Back Inspection Performed  RUE Inspected  LUE Inspection Performed                   Diagram Legend     Erythematous scaling macule/papule c/w actinic keratosis       Vascular papule c/w angioma      Pigmented verrucoid papule/plaque c/w seborrheic keratosis      Yellow umbilicated papule c/w sebaceous hyperplasia      Irregularly shaped tan macule c/w lentigo     1-2 mm smooth white papules consistent with Milia      Movable subcutaneous cyst with punctum c/w epidermal inclusion cyst      Subcutaneous movable cyst c/w pilar cyst      Firm pink to brown papule c/w dermatofibroma      Pedunculated fleshy papule(s) c/w skin tag(s)      Evenly pigmented macule c/w junctional nevus     Mildly variegated pigmented, slightly irregular-bordered macule c/w mildly atypical nevus      Flesh colored to evenly pigmented papule c/w intradermal nevus       Pink pearly papule/plaque c/w basal cell carcinoma      Erythematous hyperkeratotic cursted plaque c/w SCC       "Surgical scar with no sign of skin cancer recurrence      Open and closed comedones      Inflammatory papules and pustules      Verrucoid papule consistent consistent with wart     Erythematous eczematous patches and plaques     Dystrophic onycholytic nail with subungual debris c/w onychomycosis     Umbilicated papule    Erythematous-base heme-crusted tan verrucoid plaque consistent with inflamed seborrheic keratosis     Erythematous Silvery Scaling Plaque c/w Psoriasis     See annotation      Assessment / Plan:      Pathology Orders:     Normal Orders This Visit    Tissue Specimen To Pathology, Dermatology     Questions:    Directional Terms:  Other(comment)    Clinical information:  irritated nevus    Specific Site:  left scalp        Neoplasm of uncertain behavior of skin  -     Tissue Specimen To Pathology, Dermatology  Shave removal procedure note:    Shave removal performed after verbal consent including risk of infection, scar, recurrence, need for additional treatment of site. Area prepped with alcohol, anesthetized 1% lidocaine with epinephrine. Lesional tissue shaved. Lesion defect size 5mm No complications. Dressing applied. Wound care explained.      Nevus of multiple sites  The "ABCD" rules to observe pigmented lesions were reviewed.      Lentigines  sunscreen             Follow-up in about 1 year (around 6/4/2019).  "

## 2018-06-06 ENCOUNTER — PATIENT MESSAGE (OUTPATIENT)
Dept: FAMILY MEDICINE | Facility: CLINIC | Age: 48
End: 2018-06-06

## 2018-07-07 ENCOUNTER — PATIENT MESSAGE (OUTPATIENT)
Dept: FAMILY MEDICINE | Facility: CLINIC | Age: 48
End: 2018-07-07

## 2018-07-07 DIAGNOSIS — F41.9 ANXIETY: ICD-10-CM

## 2018-07-09 ENCOUNTER — PATIENT MESSAGE (OUTPATIENT)
Dept: FAMILY MEDICINE | Facility: CLINIC | Age: 48
End: 2018-07-09

## 2018-07-09 DIAGNOSIS — E78.5 HYPERLIPIDEMIA, UNSPECIFIED HYPERLIPIDEMIA TYPE: ICD-10-CM

## 2018-07-09 RX ORDER — CITALOPRAM 20 MG/1
20 TABLET, FILM COATED ORAL DAILY
Qty: 90 TABLET | Refills: 1 | Status: SHIPPED | OUTPATIENT
Start: 2018-07-09 | End: 2018-08-20

## 2018-07-10 RX ORDER — ATORVASTATIN CALCIUM 80 MG/1
80 TABLET, FILM COATED ORAL DAILY
Qty: 90 TABLET | Refills: 1 | Status: SHIPPED | OUTPATIENT
Start: 2018-07-10 | End: 2018-08-20

## 2018-07-10 NOTE — TELEPHONE ENCOUNTER
LOV  5/18/2018  Last refill 4/20/2018    Last Lipid  5/18/2018      Needs script sent to [facility].

## 2018-07-17 DIAGNOSIS — F41.9 ANXIETY: ICD-10-CM

## 2018-07-17 RX ORDER — ALPRAZOLAM 1 MG/1
TABLET ORAL
Qty: 60 TABLET | OUTPATIENT
Start: 2018-07-17

## 2018-08-20 ENCOUNTER — OFFICE VISIT (OUTPATIENT)
Dept: FAMILY MEDICINE | Facility: CLINIC | Age: 48
End: 2018-08-20
Payer: MEDICARE

## 2018-08-20 VITALS
HEIGHT: 74 IN | DIASTOLIC BLOOD PRESSURE: 80 MMHG | BODY MASS INDEX: 25.72 KG/M2 | SYSTOLIC BLOOD PRESSURE: 100 MMHG | WEIGHT: 200.38 LBS | OXYGEN SATURATION: 97 % | HEART RATE: 83 BPM | TEMPERATURE: 98 F

## 2018-08-20 DIAGNOSIS — G47.00 INSOMNIA, UNSPECIFIED TYPE: ICD-10-CM

## 2018-08-20 DIAGNOSIS — E78.5 HYPERLIPIDEMIA, UNSPECIFIED HYPERLIPIDEMIA TYPE: ICD-10-CM

## 2018-08-20 DIAGNOSIS — F41.1 GAD (GENERALIZED ANXIETY DISORDER): Primary | ICD-10-CM

## 2018-08-20 PROCEDURE — 99214 OFFICE O/P EST MOD 30 MIN: CPT | Mod: S$PBB,,, | Performed by: FAMILY MEDICINE

## 2018-08-20 PROCEDURE — 99999 PR PBB SHADOW E&M-EST. PATIENT-LVL III: CPT | Mod: PBBFAC,,, | Performed by: FAMILY MEDICINE

## 2018-08-20 PROCEDURE — 99213 OFFICE O/P EST LOW 20 MIN: CPT | Mod: PBBFAC,PO | Performed by: FAMILY MEDICINE

## 2018-08-20 RX ORDER — CITALOPRAM 40 MG/1
40 TABLET, FILM COATED ORAL DAILY
Qty: 90 TABLET | Refills: 3 | Status: SHIPPED | OUTPATIENT
Start: 2018-08-20 | End: 2019-04-08 | Stop reason: SDUPTHER

## 2018-08-20 RX ORDER — BUSPIRONE HYDROCHLORIDE 7.5 MG/1
7.5 TABLET ORAL 2 TIMES DAILY PRN
Qty: 60 TABLET | Refills: 0 | Status: SHIPPED | OUTPATIENT
Start: 2018-08-20 | End: 2018-09-20 | Stop reason: SDUPTHER

## 2018-08-20 RX ORDER — TRAZODONE HYDROCHLORIDE 50 MG/1
100 TABLET ORAL NIGHTLY
Qty: 60 TABLET | Refills: 0 | Status: SHIPPED | OUTPATIENT
Start: 2018-08-20 | End: 2018-09-20 | Stop reason: SDUPTHER

## 2018-08-20 RX ORDER — ROSUVASTATIN CALCIUM 40 MG/1
40 TABLET, COATED ORAL NIGHTLY
Qty: 90 TABLET | Refills: 1 | Status: SHIPPED | OUTPATIENT
Start: 2018-08-20 | End: 2019-01-17 | Stop reason: SDUPTHER

## 2018-08-20 NOTE — PROGRESS NOTES
"Subjective:       Patient ID: Moreno Avitia is a 48 y.o. male.    Chief Complaint: Discuss Medications    48 year old male with anxiety and chronic lower back pain presents to discuss xanax twice a day. He takes it if he is nervous and to sleep at nignt. He is taking celexa 20 mg. He states he was told by his pain management doctor that he cannot have the dose of pain medication that he would like due to him being on xanax. He has been off of his xanax for at least a week now. He would like kendal,ethigardenia to replace this.       Review of Systems    Objective:       Vitals:    08/20/18 1537   BP: 100/80   Pulse: 83   Temp: 98.4 °F (36.9 °C)   TempSrc: Oral   SpO2: 97%   Weight: 90.9 kg (200 lb 6.4 oz)   Height: 6' 2" (1.88 m)       Physical Exam   Constitutional: He is oriented to person, place, and time. He appears well-developed and well-nourished. No distress.   HENT:   Head: Normocephalic and atraumatic.   Neck: Normal range of motion. Neck supple.   Cardiovascular: Normal rate, regular rhythm and normal heart sounds. Exam reveals no gallop and no friction rub.   No murmur heard.  Neurological: He is alert and oriented to person, place, and time.   Skin: He is not diaphoretic.       Assessment:       1. VITO (generalized anxiety disorder)    2. Insomnia, unspecified type    3. Hyperlipidemia, unspecified hyperlipidemia type        Plan:       Moreno was seen today for discuss medications.    Diagnoses and all orders for this visit:    VITO (generalized anxiety disorder)  -     citalopram (CELEXA) 40 MG tablet; Take 1 tablet (40 mg total) by mouth once daily.  -     busPIRone (BUSPAR) 7.5 MG tablet; Take 1 tablet (7.5 mg total) by mouth 2 (two) times daily as needed (anxiety).  We increased her celexa 40 mg and starting buspar to replace his xanax      Insomnia, unspecified type  -     traZODone (DESYREL) 50 MG tablet; Take 2 tablets (100 mg total) by mouth every evening.  Stable. Refilled meds.     Hyperlipidemia, " unspecified hyperlipidemia type  -     rosuvastatin (CRESTOR) 40 MG Tab; Take 1 tablet (40 mg total) by mouth every evening.  -     Comprehensive metabolic panel; Future  -     Lipid panel; Future  Stable. Refilled meds.

## 2018-09-20 DIAGNOSIS — G47.00 INSOMNIA, UNSPECIFIED TYPE: ICD-10-CM

## 2018-09-20 DIAGNOSIS — F41.1 GAD (GENERALIZED ANXIETY DISORDER): ICD-10-CM

## 2018-09-20 RX ORDER — BUSPIRONE HYDROCHLORIDE 7.5 MG/1
7.5 TABLET ORAL 2 TIMES DAILY PRN
Qty: 60 TABLET | Refills: 0 | Status: SHIPPED | OUTPATIENT
Start: 2018-09-20 | End: 2018-10-08 | Stop reason: SDUPTHER

## 2018-09-20 RX ORDER — TRAZODONE HYDROCHLORIDE 50 MG/1
100 TABLET ORAL NIGHTLY
Qty: 60 TABLET | Refills: 0 | Status: SHIPPED | OUTPATIENT
Start: 2018-09-20 | End: 2018-10-08 | Stop reason: SDUPTHER

## 2018-09-20 NOTE — TELEPHONE ENCOUNTER
----- Message from Magnus Harris sent at 9/20/2018 12:36 PM CDT -----  Contact: Joanne/[employer]/[phone]  Refill:  busPIRone (BUSPAR) 7.5 MG tablet  Refill:  traZODone (DESYREL) 50 MG tablet    Pharmacy:  [facility] - [address] [phone] (Phone) [phone] (Fax)    Thank you.

## 2018-10-08 ENCOUNTER — PATIENT MESSAGE (OUTPATIENT)
Dept: FAMILY MEDICINE | Facility: CLINIC | Age: 48
End: 2018-10-08

## 2018-10-08 DIAGNOSIS — F41.1 GAD (GENERALIZED ANXIETY DISORDER): ICD-10-CM

## 2018-10-08 DIAGNOSIS — G47.00 INSOMNIA, UNSPECIFIED TYPE: ICD-10-CM

## 2018-10-08 RX ORDER — TRAZODONE HYDROCHLORIDE 50 MG/1
100 TABLET ORAL NIGHTLY
Qty: 60 TABLET | Refills: 2 | Status: SHIPPED | OUTPATIENT
Start: 2018-10-08 | End: 2019-01-02 | Stop reason: SDUPTHER

## 2018-10-08 RX ORDER — BUSPIRONE HYDROCHLORIDE 7.5 MG/1
7.5 TABLET ORAL 2 TIMES DAILY PRN
Qty: 60 TABLET | Refills: 2 | Status: SHIPPED | OUTPATIENT
Start: 2018-10-08 | End: 2018-12-10 | Stop reason: SDUPTHER

## 2018-11-05 RX ORDER — CITALOPRAM 20 MG/1
TABLET, FILM COATED ORAL
Qty: 90 TABLET | Refills: 1 | Status: SHIPPED | OUTPATIENT
Start: 2018-11-05 | End: 2019-04-08

## 2018-12-10 ENCOUNTER — OFFICE VISIT (OUTPATIENT)
Dept: FAMILY MEDICINE | Facility: CLINIC | Age: 48
End: 2018-12-10
Payer: MEDICARE

## 2018-12-10 VITALS
OXYGEN SATURATION: 97 % | TEMPERATURE: 98 F | HEIGHT: 74 IN | SYSTOLIC BLOOD PRESSURE: 118 MMHG | HEART RATE: 69 BPM | DIASTOLIC BLOOD PRESSURE: 74 MMHG | WEIGHT: 211.19 LBS | BODY MASS INDEX: 27.1 KG/M2

## 2018-12-10 DIAGNOSIS — L03.211 FACIAL CELLULITIS: Primary | ICD-10-CM

## 2018-12-10 DIAGNOSIS — F41.1 GAD (GENERALIZED ANXIETY DISORDER): ICD-10-CM

## 2018-12-10 PROCEDURE — 99999 PR PBB SHADOW E&M-EST. PATIENT-LVL III: CPT | Mod: PBBFAC,,, | Performed by: FAMILY MEDICINE

## 2018-12-10 PROCEDURE — 99213 OFFICE O/P EST LOW 20 MIN: CPT | Mod: PBBFAC,PO | Performed by: FAMILY MEDICINE

## 2018-12-10 PROCEDURE — 99213 OFFICE O/P EST LOW 20 MIN: CPT | Mod: S$PBB,,, | Performed by: FAMILY MEDICINE

## 2018-12-10 RX ORDER — SULFAMETHOXAZOLE AND TRIMETHOPRIM 800; 160 MG/1; MG/1
1 TABLET ORAL 2 TIMES DAILY
Qty: 14 TABLET | Refills: 0 | Status: SHIPPED | OUTPATIENT
Start: 2018-12-10 | End: 2018-12-10 | Stop reason: SDUPTHER

## 2018-12-10 RX ORDER — BUSPIRONE HYDROCHLORIDE 7.5 MG/1
7.5 TABLET ORAL 2 TIMES DAILY PRN
Qty: 60 TABLET | Refills: 2 | Status: SHIPPED | OUTPATIENT
Start: 2018-12-10 | End: 2019-01-25 | Stop reason: SDUPTHER

## 2018-12-10 RX ORDER — SULFAMETHOXAZOLE AND TRIMETHOPRIM 800; 160 MG/1; MG/1
1 TABLET ORAL 2 TIMES DAILY
Qty: 14 TABLET | Refills: 0 | Status: SHIPPED | OUTPATIENT
Start: 2018-12-10 | End: 2018-12-17

## 2018-12-10 NOTE — PROGRESS NOTES
"Subjective:       Patient ID: Moreno Avitia is a 48 y.o. male.    Chief Complaint: Abscess    48 year old male presents for cellulitis to his face. He has had this swelling and redness for the last 4 days. He has no fever or chills. He has not taken anything for this.     He is also on buspar 7.5 mg daily and states [facility] don't have this available.       Review of Systems   Constitutional: Negative for activity change and unexpected weight change.   HENT: Negative for hearing loss, rhinorrhea and trouble swallowing.    Eyes: Negative for discharge and visual disturbance.   Respiratory: Negative for chest tightness and wheezing.    Cardiovascular: Negative for chest pain and palpitations.   Gastrointestinal: Negative for blood in stool, constipation, diarrhea and vomiting.   Endocrine: Negative for polydipsia and polyuria.   Genitourinary: Negative for difficulty urinating, hematuria and urgency.   Musculoskeletal: Negative for arthralgias, joint swelling and neck pain.   Neurological: Negative for weakness and headaches.   Psychiatric/Behavioral: Negative for confusion and dysphoric mood.       Objective:       Vitals:    12/10/18 1546   BP: 118/74   Pulse: 69   Temp: 98.3 °F (36.8 °C)   TempSrc: Oral   SpO2: 97%   Weight: 95.8 kg (211 lb 3.2 oz)   Height: 6' 2" (1.88 m)       Physical Exam   Constitutional: He appears well-developed and well-nourished. No distress.   HENT:   Head: Normocephalic and atraumatic.       Skin: He is not diaphoretic.       Assessment:       1. Facial cellulitis    2. VITO (generalized anxiety disorder)        Plan:       Moreno was seen today for abscess.    Diagnoses and all orders for this visit:    Facial cellulitis  -     sulfamethoxazole-trimethoprim 800-160mg (BACTRIM DS) 800-160 mg Tab; Take 1 tablet by mouth 2 (two) times daily. for 7 days  Start bactrim twice a day for 7 days.     VITO (generalized anxiety disorder)  -     busPIRone (BUSPAR) 7.5 MG tablet; Take 1 " tablet (7.5 mg total) by mouth 2 (two) times daily as needed (anxiety).

## 2019-01-02 DIAGNOSIS — F41.1 GAD (GENERALIZED ANXIETY DISORDER): ICD-10-CM

## 2019-01-02 DIAGNOSIS — G47.00 INSOMNIA, UNSPECIFIED TYPE: ICD-10-CM

## 2019-01-03 RX ORDER — BUSPIRONE HYDROCHLORIDE 7.5 MG/1
TABLET ORAL
Qty: 60 TABLET | Refills: 0 | OUTPATIENT
Start: 2019-01-03

## 2019-01-03 RX ORDER — TRAZODONE HYDROCHLORIDE 50 MG/1
TABLET ORAL
Qty: 60 TABLET | Refills: 2 | Status: SHIPPED | OUTPATIENT
Start: 2019-01-03 | End: 2019-03-30 | Stop reason: SDUPTHER

## 2019-01-17 DIAGNOSIS — E78.5 HYPERLIPIDEMIA, UNSPECIFIED HYPERLIPIDEMIA TYPE: ICD-10-CM

## 2019-01-17 RX ORDER — ROSUVASTATIN CALCIUM 40 MG/1
TABLET, FILM COATED ORAL
Qty: 90 TABLET | Refills: 1 | Status: SHIPPED | OUTPATIENT
Start: 2019-01-17 | End: 2019-06-12 | Stop reason: SDUPTHER

## 2019-01-24 ENCOUNTER — PATIENT MESSAGE (OUTPATIENT)
Dept: FAMILY MEDICINE | Facility: CLINIC | Age: 49
End: 2019-01-24

## 2019-01-24 DIAGNOSIS — F41.1 GAD (GENERALIZED ANXIETY DISORDER): ICD-10-CM

## 2019-01-25 RX ORDER — BUSPIRONE HYDROCHLORIDE 7.5 MG/1
7.5 TABLET ORAL 2 TIMES DAILY PRN
Qty: 180 TABLET | Refills: 1 | Status: SHIPPED | OUTPATIENT
Start: 2019-01-25 | End: 2019-06-12 | Stop reason: SDUPTHER

## 2019-02-26 ENCOUNTER — PATIENT MESSAGE (OUTPATIENT)
Dept: FAMILY MEDICINE | Facility: CLINIC | Age: 49
End: 2019-02-26

## 2019-02-26 DIAGNOSIS — G89.29 OTHER CHRONIC PAIN: Primary | ICD-10-CM

## 2019-03-01 DIAGNOSIS — Z12.11 COLON CANCER SCREENING: ICD-10-CM

## 2019-03-30 DIAGNOSIS — G47.00 INSOMNIA, UNSPECIFIED TYPE: ICD-10-CM

## 2019-04-01 RX ORDER — TRAZODONE HYDROCHLORIDE 50 MG/1
TABLET ORAL
Qty: 60 TABLET | Refills: 2 | Status: SHIPPED | OUTPATIENT
Start: 2019-04-01 | End: 2019-06-07 | Stop reason: SDUPTHER

## 2019-04-08 ENCOUNTER — OFFICE VISIT (OUTPATIENT)
Dept: FAMILY MEDICINE | Facility: CLINIC | Age: 49
End: 2019-04-08
Payer: MEDICARE

## 2019-04-08 VITALS
OXYGEN SATURATION: 95 % | BODY MASS INDEX: 27.33 KG/M2 | RESPIRATION RATE: 18 BRPM | HEIGHT: 74 IN | SYSTOLIC BLOOD PRESSURE: 132 MMHG | DIASTOLIC BLOOD PRESSURE: 74 MMHG | HEART RATE: 80 BPM | TEMPERATURE: 98 F | WEIGHT: 212.94 LBS

## 2019-04-08 DIAGNOSIS — M77.8 TENDINITIS OF LEFT SHOULDER: Primary | ICD-10-CM

## 2019-04-08 DIAGNOSIS — F41.1 GAD (GENERALIZED ANXIETY DISORDER): ICD-10-CM

## 2019-04-08 PROCEDURE — 99214 PR OFFICE/OUTPT VISIT, EST, LEVL IV, 30-39 MIN: ICD-10-PCS | Mod: S$PBB,25,, | Performed by: FAMILY MEDICINE

## 2019-04-08 PROCEDURE — 99213 OFFICE O/P EST LOW 20 MIN: CPT | Mod: PBBFAC,PO | Performed by: FAMILY MEDICINE

## 2019-04-08 PROCEDURE — 99999 PR PBB SHADOW E&M-EST. PATIENT-LVL III: ICD-10-PCS | Mod: PBBFAC,,, | Performed by: FAMILY MEDICINE

## 2019-04-08 PROCEDURE — 99999 PR PBB SHADOW E&M-EST. PATIENT-LVL III: CPT | Mod: PBBFAC,,, | Performed by: FAMILY MEDICINE

## 2019-04-08 PROCEDURE — 99214 OFFICE O/P EST MOD 30 MIN: CPT | Mod: S$PBB,25,, | Performed by: FAMILY MEDICINE

## 2019-04-08 RX ORDER — METHYLPREDNISOLONE ACETATE 40 MG/ML
40 INJECTION, SUSPENSION INTRA-ARTICULAR; INTRALESIONAL; INTRAMUSCULAR; SOFT TISSUE
Status: COMPLETED | OUTPATIENT
Start: 2019-04-08 | End: 2019-04-08

## 2019-04-08 RX ORDER — CITALOPRAM 40 MG/1
40 TABLET, FILM COATED ORAL DAILY
Qty: 90 TABLET | Refills: 3 | Status: SHIPPED | OUTPATIENT
Start: 2019-04-08 | End: 2020-04-14

## 2019-04-08 RX ADMIN — METHYLPREDNISOLONE ACETATE 40 MG: 40 INJECTION, SUSPENSION INTRA-ARTICULAR; INTRALESIONAL; INTRAMUSCULAR; SOFT TISSUE at 11:04

## 2019-04-08 NOTE — PROGRESS NOTES
Subjective:       Patient ID: Moreno Avitia is a 49 y.o. male.    Chief Complaint: left shoulder pain    49 year old male presents with the left shoulder pain. He has pains with abduction up to 90 degrees. He has had bursitis and rotator cuff injuries in the same shoulder int he past. He states it started after using his electric elmira and it is worse at night. He has pain with lying on That shoulder. He is taking motrin and his pain pills for the pain.     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    likely from opioid use      Past Surgical History:   Procedure Laterality Date    BACK SURGERY  2/7/13      L5-S1 discectomy    DISCOGRAM-LUMBAR N/A 10/20/2016    Performed by [facility] OR 2ND FLR    INJECTION-STEROID-EPIDURAL Bilateral 11/15/2012    [facility]    KNEE ARTHROSCOPY W/ MENISCAL REPAIR  2009    RT KNEE    KNEE CARTILAGE SURGERY      LAMINECTOMY, SPINE, LUMBAR, WITH DISCECTOMY Right 2/26/2013    [facility] OR    LAMINECTOMY, SPINE, LUMBAR, WITH DISCECTOMY N/A 2/7/2013    [facility] OR    MENISCUS      RIGHT    SINUS SURGERY      SINUS SURGERY      FRONTAL     Family History   Problem Relation Age of Onset    Hypertension Father     Diabetes Father     No Known Problems Mother     No Known Problems Sister     No Known Problems Brother     No Known Problems Maternal Aunt     No Known Problems Maternal Uncle     No Known Problems Paternal Aunt     No Known Problems Paternal Uncle     No Known Problems Maternal Grandmother     No Known Problems Maternal Grandfather     No Known Problems Paternal Grandmother     No Known Problems Paternal Grandfather   "   Amblyopia Neg Hx     Blindness Neg Hx     Cancer Neg Hx     Cataracts Neg Hx     Glaucoma Neg Hx     Macular degeneration Neg Hx     Retinal detachment Neg Hx     Strabismus Neg Hx     Stroke Neg Hx     Thyroid disease Neg Hx      Social History     Socioeconomic History    Marital status:      Spouse name: Not on file    Number of children: Not on file    Years of education: Not on file    Highest education level: Not on file   Occupational History    Not on file   Social Needs    Financial resource strain: Not on file    Food insecurity:     Worry: Not on file     Inability: Not on file    Transportation needs:     Medical: Not on file     Non-medical: Not on file   Tobacco Use    Smoking status: Current Some Day Smoker     Packs/day: 0.25     Types: Cigarettes     Last attempt to quit: 3/21/2014     Years since quittin.0    Smokeless tobacco: Never Used    Tobacco comment: on chantix    Substance and Sexual Activity    Alcohol use: Yes     Alcohol/week: 0.0 oz     Comment: every other weekend, 2-3 beers    Drug use: No    Sexual activity: Yes     Partners: Female     Comment:    Lifestyle    Physical activity:     Days per week: Not on file     Minutes per session: Not on file    Stress: Not on file   Relationships    Social connections:     Talks on phone: Not on file     Gets together: Not on file     Attends Congregation service: Not on file     Active member of club or organization: Not on file     Attends meetings of clubs or organizations: Not on file     Relationship status: Not on file   Other Topics Concern    Not on file   Social History Narrative    Not on file       Review of Systems    Objective:       Vitals:    19 1101   BP: 132/74   Pulse: 80   Resp: 18   Temp: 98.2 °F (36.8 °C)   TempSrc: Oral   SpO2: 95%   Weight: 96.6 kg (212 lb 15.4 oz)   Height: 6' 2" (1.88 m)       Physical Exam   Constitutional: He is oriented to person, place, and time. " He appears well-developed and well-nourished. No distress.   HENT:   Head: Normocephalic and atraumatic.   Eyes: Conjunctivae are normal.   Neck: Normal range of motion. Neck supple.   Cardiovascular: Normal rate, regular rhythm and normal heart sounds. Exam reveals no gallop and no friction rub.   No murmur heard.  Pulmonary/Chest: Effort normal and breath sounds normal. No respiratory distress. He has no wheezes. He has no rales.   Musculoskeletal:        Left shoulder: He exhibits decreased range of motion, tenderness, pain and decreased strength. He exhibits no bony tenderness, no swelling, no effusion and no crepitus.   Neurological: He is alert and oriented to person, place, and time.   Skin: He is not diaphoretic.         Date: 4/8/2019  Time:11:45 AM  Name of the procedure being done: SHOULDER INJECTION  Indications: SHOULDER PAIN AND LIMITED RANGE OF MOTION  Patient consent:  Patient was given informed consent. Informed of risks associated with this, which include bleeding, infection, and pain. Patient advised that oral anti-inflammatories are another option other than an injection. He has had the procedure before and was successful so would like to proceed with this. Pertinent Lab Values: N/A  Type of Anesthesia used: 1% lidocaine with epinephrine  Description of the procedure: Using sterile prep, local anesthesia with 40mg of depomedrol was injected into the posterior aspect of the shoulder joint under the acromion.. A bandage was applied after.   Complications:none  Estimated blood loss: none  Disposition: Pt tolerated the procedure well      Assessment:       1. Tendinitis of left shoulder    2. VITO (generalized anxiety disorder)        Plan:       Moreno was seen today for left shoulder pain.    Diagnoses and all orders for this visit:    Tendinitis of left shoulder  -     methylPREDNISolone acetate injection 40 mg    VITO (generalized anxiety disorder)  -     citalopram (CELEXA) 40 MG tablet; Take 1  tablet (40 mg total) by mouth once daily.

## 2019-04-10 ENCOUNTER — PATIENT MESSAGE (OUTPATIENT)
Dept: FAMILY MEDICINE | Facility: CLINIC | Age: 49
End: 2019-04-10

## 2019-04-10 DIAGNOSIS — M25.519 SHOULDER PAIN, UNSPECIFIED CHRONICITY, UNSPECIFIED LATERALITY: Primary | ICD-10-CM

## 2019-06-07 ENCOUNTER — OFFICE VISIT (OUTPATIENT)
Dept: FAMILY MEDICINE | Facility: CLINIC | Age: 49
End: 2019-06-07
Payer: MEDICARE

## 2019-06-07 ENCOUNTER — PATIENT MESSAGE (OUTPATIENT)
Dept: FAMILY MEDICINE | Facility: CLINIC | Age: 49
End: 2019-06-07

## 2019-06-07 VITALS
SYSTOLIC BLOOD PRESSURE: 128 MMHG | BODY MASS INDEX: 27.02 KG/M2 | OXYGEN SATURATION: 97 % | HEART RATE: 63 BPM | HEIGHT: 74 IN | TEMPERATURE: 99 F | WEIGHT: 210.56 LBS | DIASTOLIC BLOOD PRESSURE: 86 MMHG

## 2019-06-07 DIAGNOSIS — Z12.11 COLON CANCER SCREENING: Primary | ICD-10-CM

## 2019-06-07 DIAGNOSIS — G47.00 INSOMNIA, UNSPECIFIED TYPE: ICD-10-CM

## 2019-06-07 DIAGNOSIS — J05.10: ICD-10-CM

## 2019-06-07 PROCEDURE — 99214 PR OFFICE/OUTPT VISIT, EST, LEVL IV, 30-39 MIN: ICD-10-PCS | Mod: S$PBB,,, | Performed by: FAMILY MEDICINE

## 2019-06-07 PROCEDURE — 99999 PR PBB SHADOW E&M-EST. PATIENT-LVL III: ICD-10-PCS | Mod: PBBFAC,,, | Performed by: FAMILY MEDICINE

## 2019-06-07 PROCEDURE — 96372 THER/PROPH/DIAG INJ SC/IM: CPT | Mod: PBBFAC,PO

## 2019-06-07 PROCEDURE — 99213 OFFICE O/P EST LOW 20 MIN: CPT | Mod: PBBFAC,PO,25 | Performed by: FAMILY MEDICINE

## 2019-06-07 PROCEDURE — 99214 OFFICE O/P EST MOD 30 MIN: CPT | Mod: S$PBB,,, | Performed by: FAMILY MEDICINE

## 2019-06-07 PROCEDURE — 99999 PR PBB SHADOW E&M-EST. PATIENT-LVL III: CPT | Mod: PBBFAC,,, | Performed by: FAMILY MEDICINE

## 2019-06-07 RX ORDER — GABAPENTIN 100 MG/1
300 CAPSULE ORAL
COMMUNITY
Start: 2019-03-20 | End: 2019-06-07

## 2019-06-07 RX ORDER — CEFTRIAXONE 1 G/1
1 INJECTION, POWDER, FOR SOLUTION INTRAMUSCULAR; INTRAVENOUS
Status: COMPLETED | OUTPATIENT
Start: 2019-06-07 | End: 2019-06-07

## 2019-06-07 RX ORDER — CEPHALEXIN 500 MG/1
500 CAPSULE ORAL EVERY 12 HOURS
Qty: 14 CAPSULE | Refills: 0 | Status: SHIPPED | OUTPATIENT
Start: 2019-06-07 | End: 2019-06-07 | Stop reason: SDUPTHER

## 2019-06-07 RX ORDER — GABAPENTIN 300 MG/1
300 CAPSULE ORAL 3 TIMES DAILY
COMMUNITY

## 2019-06-07 RX ORDER — CEPHALEXIN 500 MG/1
500 CAPSULE ORAL EVERY 12 HOURS
Qty: 14 CAPSULE | Refills: 0 | Status: SHIPPED | OUTPATIENT
Start: 2019-06-07 | End: 2019-06-14

## 2019-06-07 RX ORDER — TRAZODONE HYDROCHLORIDE 50 MG/1
TABLET ORAL
Qty: 60 TABLET | Refills: 2 | Status: SHIPPED | OUTPATIENT
Start: 2019-06-07 | End: 2019-08-26 | Stop reason: SDUPTHER

## 2019-06-07 RX ADMIN — CEFTRIAXONE SODIUM 1 G: 1 INJECTION, POWDER, FOR SOLUTION INTRAMUSCULAR; INTRAVENOUS at 04:06

## 2019-06-07 NOTE — PROGRESS NOTES
Subjective:       Patient ID: Moreno Avitia is a 49 y.o. male.    Chief Complaint: No chief complaint on file.    Sore Throat    This is a new problem. The current episode started in the past 7 days. The problem has been unchanged. There has been no fever. The pain is at a severity of 10/10. Associated symptoms include swollen glands and trouble swallowing. Pertinent negatives include no congestion, coughing, diarrhea, ear discharge, ear pain or shortness of breath. He has had no exposure to mono. He has tried nothing for the symptoms. The treatment provided no relief.     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    likely from opioid use      Past Surgical History:   Procedure Laterality Date    BACK SURGERY  2/7/13      L5-S1 discectomy    DISCOGRAM-LUMBAR N/A 10/20/2016    Performed by [facility] 2ND FLR    INJECTION-STEROID-EPIDURAL Bilateral 11/15/2012    [facility]    KNEE ARTHROSCOPY W/ MENISCAL REPAIR  2009    RT KNEE    KNEE CARTILAGE SURGERY      LAMINECTOMY, SPINE, LUMBAR, WITH DISCECTOMY Right 2/26/2013    [facility] OR    LAMINECTOMY, SPINE, LUMBAR, WITH DISCECTOMY N/A 2/7/2013    [facility] OR    MENISCUS      RIGHT    SINUS SURGERY      SINUS SURGERY      FRONTAL     Family History   Problem Relation Age of Onset    Hypertension Father     Diabetes Father     No Known Problems Mother     No Known Problems Sister     No Known Problems Brother     No Known Problems Maternal Aunt     No Known Problems Maternal Uncle     No Known Problems Paternal Aunt     No Known Problems Paternal Uncle     No Known Problems Maternal Grandmother     No Known  Problems Maternal Grandfather     No Known Problems Paternal Grandmother     No Known Problems Paternal Grandfather     Amblyopia Neg Hx     Blindness Neg Hx     Cancer Neg Hx     Cataracts Neg Hx     Glaucoma Neg Hx     Macular degeneration Neg Hx     Retinal detachment Neg Hx     Strabismus Neg Hx     Stroke Neg Hx     Thyroid disease Neg Hx      Social History     Socioeconomic History    Marital status:      Spouse name: Not on file    Number of children: Not on file    Years of education: Not on file    Highest education level: Not on file   Occupational History    Not on file   Social Needs    Financial resource strain: Not on file    Food insecurity:     Worry: Not on file     Inability: Not on file    Transportation needs:     Medical: Not on file     Non-medical: Not on file   Tobacco Use    Smoking status: Current Some Day Smoker     Packs/day: 0.25     Types: Cigarettes     Last attempt to quit: 3/21/2014     Years since quittin.2    Smokeless tobacco: Never Used    Tobacco comment: on chantix    Substance and Sexual Activity    Alcohol use: Yes     Alcohol/week: 0.0 oz     Comment: every other weekend, 2-3 beers    Drug use: No    Sexual activity: Yes     Partners: Female     Comment:    Lifestyle    Physical activity:     Days per week: Not on file     Minutes per session: Not on file    Stress: Not on file   Relationships    Social connections:     Talks on phone: Not on file     Gets together: Not on file     Attends Religion service: Not on file     Active member of club or organization: Not on file     Attends meetings of clubs or organizations: Not on file     Relationship status: Not on file   Other Topics Concern    Not on file   Social History Narrative    Not on file       Review of Systems   Constitutional: Positive for chills and diaphoresis. Negative for fever.   HENT: Positive for postnasal drip, rhinorrhea, sneezing, sore throat and trouble  "swallowing. Negative for congestion, ear discharge and ear pain.    Respiratory: Negative for cough and shortness of breath.    Gastrointestinal: Negative for diarrhea.       Objective:       Vitals:    06/07/19 1506   BP: 128/86   Pulse: 63   Temp: 98.9 °F (37.2 °C)   TempSrc: Oral   SpO2: 97%   Weight: 95.5 kg (210 lb 8.6 oz)   Height: 6' 2" (1.88 m)       Physical Exam   Constitutional: He is oriented to person, place, and time. He appears well-developed and well-nourished. No distress.   HENT:   Head: Normocephalic and atraumatic.   Right Ear: External ear normal.   Left Ear: External ear normal.   Mouth/Throat: Oropharynx is clear and moist. No oropharyngeal exudate.       Neck: Normal range of motion. Neck supple.   Cardiovascular: Normal rate, regular rhythm and normal heart sounds. Exam reveals no gallop and no friction rub.   No murmur heard.  Pulmonary/Chest: Effort normal and breath sounds normal. No respiratory distress. He has no wheezes. He has no rales. He exhibits no tenderness.   Lymphadenopathy:     He has cervical adenopathy.   Neurological: He is alert and oriented to person, place, and time.   Skin: He is not diaphoretic.       Assessment:       1. Colon cancer screening    2. Epiglottiditis        Plan:       Diagnoses and all orders for this visit:    Colon cancer screening  -     Fecal Immunochemical Test (iFOBT); Future  -     Fecal Immunochemical Test (iFOBT)    Epiglottiditis  -     cefTRIAXone injection 1 g  -     cephALEXin (KEFLEX) 500 MG capsule; Take 1 capsule (500 mg total) by mouth every 12 (twelve) hours. for 7 days           "

## 2019-06-07 NOTE — PROGRESS NOTES
Pt tolerated injection of rocephin 1G to right ventrogluteal without difficulty; no adverse reaction noted

## 2019-06-12 DIAGNOSIS — E78.5 HYPERLIPIDEMIA, UNSPECIFIED HYPERLIPIDEMIA TYPE: ICD-10-CM

## 2019-06-12 DIAGNOSIS — F41.1 GAD (GENERALIZED ANXIETY DISORDER): ICD-10-CM

## 2019-06-12 RX ORDER — ROSUVASTATIN CALCIUM 40 MG/1
TABLET, COATED ORAL
Qty: 90 TABLET | Refills: 1 | Status: SHIPPED | OUTPATIENT
Start: 2019-06-12 | End: 2019-11-21 | Stop reason: SDUPTHER

## 2019-06-12 RX ORDER — BUSPIRONE HYDROCHLORIDE 7.5 MG/1
TABLET ORAL
Qty: 180 TABLET | Refills: 1 | Status: SHIPPED | OUTPATIENT
Start: 2019-06-12 | End: 2019-11-21 | Stop reason: SDUPTHER

## 2019-07-10 ENCOUNTER — TELEPHONE (OUTPATIENT)
Dept: FAMILY MEDICINE | Facility: CLINIC | Age: 49
End: 2019-07-10

## 2019-07-10 DIAGNOSIS — Z12.11 COLON CANCER SCREENING: Primary | ICD-10-CM

## 2019-07-10 NOTE — TELEPHONE ENCOUNTER
----- Message from Marcela Rolle sent at 6/27/2019  3:37 PM CDT -----  Patient order for Fobt test has been on lab pendings since 6/7/2019, a new order is needed when patient collect specimen

## 2019-08-26 DIAGNOSIS — G47.00 INSOMNIA, UNSPECIFIED TYPE: ICD-10-CM

## 2019-08-26 RX ORDER — TRAZODONE HYDROCHLORIDE 50 MG/1
TABLET ORAL
Qty: 60 TABLET | Refills: 1 | Status: SHIPPED | OUTPATIENT
Start: 2019-08-26 | End: 2019-10-01 | Stop reason: SDUPTHER

## 2019-10-01 DIAGNOSIS — G47.00 INSOMNIA, UNSPECIFIED TYPE: ICD-10-CM

## 2019-10-01 RX ORDER — TRAZODONE HYDROCHLORIDE 50 MG/1
TABLET ORAL
Qty: 60 TABLET | Refills: 12 | Status: SHIPPED | OUTPATIENT
Start: 2019-10-01 | End: 2020-07-29 | Stop reason: SDUPTHER

## 2019-10-29 ENCOUNTER — TELEPHONE (OUTPATIENT)
Dept: DERMATOLOGY | Facility: CLINIC | Age: 49
End: 2019-10-29

## 2019-10-29 ENCOUNTER — PATIENT OUTREACH (OUTPATIENT)
Dept: ADMINISTRATIVE | Facility: OTHER | Age: 49
End: 2019-10-29

## 2019-10-29 NOTE — TELEPHONE ENCOUNTER
----- Message from Irma Hendricks sent at 10/29/2019  2:40 PM CDT -----  Contact: Pt  Pt is requesting an appt due to skin check    Pt can be reached at [phone]

## 2019-10-30 ENCOUNTER — OFFICE VISIT (OUTPATIENT)
Dept: DERMATOLOGY | Facility: CLINIC | Age: 49
End: 2019-10-30
Payer: MEDICARE

## 2019-10-30 VITALS — WEIGHT: 210 LBS | BODY MASS INDEX: 26.96 KG/M2

## 2019-10-30 DIAGNOSIS — L81.4 LENTIGINES: ICD-10-CM

## 2019-10-30 DIAGNOSIS — Z87.898 HISTORY OF ATYPICAL NEVUS: ICD-10-CM

## 2019-10-30 DIAGNOSIS — D22.9 NEVUS OF MULTIPLE SITES: ICD-10-CM

## 2019-10-30 DIAGNOSIS — L21.9 SEBORRHEIC DERMATITIS: Primary | ICD-10-CM

## 2019-10-30 PROCEDURE — 99999 PR PBB SHADOW E&M-EST. PATIENT-LVL III: ICD-10-PCS | Mod: PBBFAC,,, | Performed by: DERMATOLOGY

## 2019-10-30 PROCEDURE — 99999 PR PBB SHADOW E&M-EST. PATIENT-LVL III: CPT | Mod: PBBFAC,,, | Performed by: DERMATOLOGY

## 2019-10-30 PROCEDURE — 99213 PR OFFICE/OUTPT VISIT, EST, LEVL III, 20-29 MIN: ICD-10-PCS | Mod: S$PBB,,, | Performed by: DERMATOLOGY

## 2019-10-30 PROCEDURE — 99213 OFFICE O/P EST LOW 20 MIN: CPT | Mod: PBBFAC,PO | Performed by: DERMATOLOGY

## 2019-10-30 PROCEDURE — 99213 OFFICE O/P EST LOW 20 MIN: CPT | Mod: S$PBB,,, | Performed by: DERMATOLOGY

## 2019-10-30 RX ORDER — PREDNISONE 20 MG/1
20 TABLET ORAL DAILY
Qty: 7 TABLET | Refills: 0 | Status: SHIPPED | OUTPATIENT
Start: 2019-10-30 | End: 2019-11-06

## 2019-10-30 RX ORDER — MOMETASONE FUROATE 1 MG/ML
SOLUTION TOPICAL DAILY
Qty: 60 ML | Refills: 3 | Status: SHIPPED | OUTPATIENT
Start: 2019-10-30 | End: 2019-12-11 | Stop reason: SDUPTHER

## 2019-10-30 NOTE — PROGRESS NOTES
Subjective:       Patient ID:  Moreno Avitia is a 49 y.o. male who presents for   Chief Complaint   Patient presents with    Rash     bilateral sides of the face      Rash on face and scalp for several weeks, burns no tx.  Also would like mole check.     Rash  - Follow-up  Affected locations: face  Signs / symptoms: bruising        Review of Systems   Constitutional: Negative for fever, chills, weight loss, weight gain, fatigue, night sweats and malaise.   Skin: Positive for rash, daily sunscreen use, activity-related sunscreen use and wears hat.   Hematologic/Lymphatic: Does not bruise/bleed easily.        Objective:    Physical Exam   Constitutional: He appears well-developed and well-nourished. No distress.   Neurological: He is alert and oriented to person, place, and time. He is not disoriented.   Psychiatric: He has a normal mood and affect.   Skin:   Areas Examined (abnormalities noted in diagram):   Scalp / Hair Palpated and Inspected  Head / Face Inspection Performed  Neck Inspection Performed  Chest / Axilla Inspection Performed  Abdomen Inspection Performed  Back Inspection Performed  RUE Inspected  LUE Inspection Performed                   Diagram Legend     Erythematous scaling macule/papule c/w actinic keratosis       Vascular papule c/w angioma      Pigmented verrucoid papule/plaque c/w seborrheic keratosis      Yellow umbilicated papule c/w sebaceous hyperplasia      Irregularly shaped tan macule c/w lentigo     1-2 mm smooth white papules consistent with Milia      Movable subcutaneous cyst with punctum c/w epidermal inclusion cyst      Subcutaneous movable cyst c/w pilar cyst      Firm pink to brown papule c/w dermatofibroma      Pedunculated fleshy papule(s) c/w skin tag(s)      Evenly pigmented macule c/w junctional nevus     Mildly variegated pigmented, slightly irregular-bordered macule c/w mildly atypical nevus      Flesh colored to evenly pigmented papule c/w intradermal nevus        "Pink pearly papule/plaque c/w basal cell carcinoma      Erythematous hyperkeratotic cursted plaque c/w SCC      Surgical scar with no sign of atypical nevus recurrence      Open and closed comedones      Inflammatory papules and pustules      Verrucoid papule consistent consistent with wart     Erythematous eczematous patches and plaques     Dystrophic onycholytic nail with subungual debris c/w onychomycosis     Umbilicated papule    Erythematous-base heme-crusted tan verrucoid plaque consistent with inflamed seborrheic keratosis     Erythematous Silvery Scaling Plaque c/w Psoriasis     See annotation      Assessment / Plan:        Seborrheic dermatitis  -     predniSONE (DELTASONE) 20 MG tablet; Take 1 tablet (20 mg total) by mouth once daily. for 7 days  Dispense: 7 tablet; Refill: 0  DHS shampoo with zinc  mometasone (ELOCON) 0.1 % solution; Apply topically once daily.  Dispense: 60 mL; Refill: 3      History of atypical nevus  Comments:  mild atypia mid back     Lentigines  The "ABCD" rules to observe pigmented lesions were reviewed.      Nevus of multiple sites-     The "ABCD" rules to observe pigmented lesions were reviewed.                     Follow up in about 1 year (around 10/30/2020).  "

## 2019-11-20 ENCOUNTER — PATIENT OUTREACH (OUTPATIENT)
Dept: ADMINISTRATIVE | Facility: OTHER | Age: 49
End: 2019-11-20

## 2019-11-20 NOTE — PROGRESS NOTES
Patient contacted in regards to completion and mail back of Fit Kit. Patient contacted in regards to completion and mail back of Fit Kit.

## 2019-11-21 DIAGNOSIS — E78.5 HYPERLIPIDEMIA, UNSPECIFIED HYPERLIPIDEMIA TYPE: ICD-10-CM

## 2019-11-21 DIAGNOSIS — F41.1 GAD (GENERALIZED ANXIETY DISORDER): ICD-10-CM

## 2019-11-22 ENCOUNTER — PATIENT OUTREACH (OUTPATIENT)
Dept: ADMINISTRATIVE | Facility: OTHER | Age: 49
End: 2019-11-22

## 2019-11-22 RX ORDER — ROSUVASTATIN CALCIUM 40 MG/1
TABLET, COATED ORAL
Qty: 90 TABLET | Refills: 4 | Status: SHIPPED | OUTPATIENT
Start: 2019-11-22 | End: 2021-03-19 | Stop reason: SDUPTHER

## 2019-11-22 RX ORDER — BUSPIRONE HYDROCHLORIDE 7.5 MG/1
TABLET ORAL
Qty: 180 TABLET | Refills: 4 | Status: SHIPPED | OUTPATIENT
Start: 2019-11-22 | End: 2021-03-01 | Stop reason: SDUPTHER

## 2019-12-03 ENCOUNTER — PATIENT OUTREACH (OUTPATIENT)
Dept: ADMINISTRATIVE | Facility: OTHER | Age: 49
End: 2019-12-03

## 2019-12-11 ENCOUNTER — PATIENT MESSAGE (OUTPATIENT)
Dept: DERMATOLOGY | Facility: CLINIC | Age: 49
End: 2019-12-11

## 2019-12-11 RX ORDER — MOMETASONE FUROATE 1 MG/ML
SOLUTION TOPICAL DAILY
Qty: 60 ML | Refills: 3 | Status: SHIPPED | OUTPATIENT
Start: 2019-12-11 | End: 2021-05-20

## 2019-12-11 RX ORDER — MOMETASONE FUROATE 1 MG/ML
SOLUTION TOPICAL DAILY
Qty: 60 ML | Refills: 3 | Status: CANCELLED | OUTPATIENT
Start: 2019-12-11

## 2020-04-14 DIAGNOSIS — F41.1 GAD (GENERALIZED ANXIETY DISORDER): ICD-10-CM

## 2020-04-14 RX ORDER — CITALOPRAM 40 MG/1
TABLET, FILM COATED ORAL
Qty: 90 TABLET | Refills: 3 | Status: SHIPPED | OUTPATIENT
Start: 2020-04-14 | End: 2020-07-06

## 2020-04-29 ENCOUNTER — PATIENT OUTREACH (OUTPATIENT)
Dept: ADMINISTRATIVE | Facility: HOSPITAL | Age: 50
End: 2020-04-29

## 2020-07-15 ENCOUNTER — PATIENT OUTREACH (OUTPATIENT)
Dept: ADMINISTRATIVE | Facility: HOSPITAL | Age: 50
End: 2020-07-15

## 2020-07-17 DIAGNOSIS — Z12.11 COLON CANCER SCREENING: ICD-10-CM

## 2020-07-29 ENCOUNTER — OFFICE VISIT (OUTPATIENT)
Dept: FAMILY MEDICINE | Facility: CLINIC | Age: 50
End: 2020-07-29
Payer: MEDICARE

## 2020-07-29 VITALS
DIASTOLIC BLOOD PRESSURE: 82 MMHG | SYSTOLIC BLOOD PRESSURE: 110 MMHG | HEIGHT: 74 IN | TEMPERATURE: 99 F | HEART RATE: 82 BPM | BODY MASS INDEX: 28.01 KG/M2 | OXYGEN SATURATION: 96 % | WEIGHT: 218.25 LBS

## 2020-07-29 DIAGNOSIS — Z11.59 ENCOUNTER FOR HEPATITIS C SCREENING TEST FOR LOW RISK PATIENT: ICD-10-CM

## 2020-07-29 DIAGNOSIS — G47.00 INSOMNIA, UNSPECIFIED TYPE: ICD-10-CM

## 2020-07-29 DIAGNOSIS — Z12.5 SCREENING FOR PROSTATE CANCER: ICD-10-CM

## 2020-07-29 DIAGNOSIS — Z71.7 ENCOUNTER FOR HIV COUNSELING: ICD-10-CM

## 2020-07-29 DIAGNOSIS — E78.5 HYPERLIPIDEMIA, UNSPECIFIED HYPERLIPIDEMIA TYPE: Primary | ICD-10-CM

## 2020-07-29 DIAGNOSIS — Z11.4 ENCOUNTER FOR SCREENING FOR HIV: ICD-10-CM

## 2020-07-29 PROCEDURE — 99213 OFFICE O/P EST LOW 20 MIN: CPT | Mod: S$PBB,,, | Performed by: FAMILY MEDICINE

## 2020-07-29 PROCEDURE — 99999 PR PBB SHADOW E&M-EST. PATIENT-LVL III: ICD-10-PCS | Mod: PBBFAC,,, | Performed by: FAMILY MEDICINE

## 2020-07-29 PROCEDURE — 99999 PR PBB SHADOW E&M-EST. PATIENT-LVL III: CPT | Mod: PBBFAC,,, | Performed by: FAMILY MEDICINE

## 2020-07-29 PROCEDURE — 99213 PR OFFICE/OUTPT VISIT, EST, LEVL III, 20-29 MIN: ICD-10-PCS | Mod: S$PBB,,, | Performed by: FAMILY MEDICINE

## 2020-07-29 PROCEDURE — 99213 OFFICE O/P EST LOW 20 MIN: CPT | Mod: PBBFAC,PO | Performed by: FAMILY MEDICINE

## 2020-07-29 RX ORDER — TRAZODONE HYDROCHLORIDE 50 MG/1
100 TABLET ORAL NIGHTLY
Qty: 60 TABLET | Refills: 12 | Status: SHIPPED | OUTPATIENT
Start: 2020-07-29 | End: 2021-03-19 | Stop reason: SDUPTHER

## 2020-07-29 RX ORDER — MELOXICAM 15 MG/1
TABLET ORAL
COMMUNITY
Start: 2020-06-28

## 2020-07-29 NOTE — PROGRESS NOTES
Subjective:       Patient ID: Moreno Avitia is a 50 y.o. male.    Chief Complaint: Follow Up/ Renew Medications    50 year old male presents for follow up on insomonia. He is stable on his current dose. He sleeps 6-8 hours on this medication. He states he rests well on this.     He also has an anxiety disorder and states that wearing a mask is triggering his PTSD symptoms if he has to wear it too long. He would like a note to not have to wear one.       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      Comment:  likely from opioid use   Past Surgical History:  2/7/13: BACK SURGERY      Comment:    L5-S1 discectomy  2009: KNEE ARTHROSCOPY W/ MENISCAL REPAIR      Comment:  RT KNEE  No date: KNEE CARTILAGE SURGERY  No date: MENISCUS      Comment:  RIGHT  No date: SINUS SURGERY  No date: SINUS SURGERY      Comment:  FRONTAL  Review of patient's family history indicates:  Problem: Hypertension      Relation: Father          Age of Onset: (Not Specified)  Problem: Diabetes      Relation: Father          Age of Onset: (Not Specified)  Problem: No Known Problems      Relation: Mother          Age of Onset: (Not Specified)  Problem: No Known Problems      Relation: Sister          Age of Onset: (Not Specified)  Problem: No Known Problems      Relation: Brother          Age of Onset: (Not Specified)  Problem: No Known Problems      Relation: Maternal Aunt          Age of Onset: (Not Specified)  Problem: No Known Problems      Relation: Maternal Uncle          Age of Onset: (Not Specified)  Problem: No Known Problems      Relation: Paternal Aunt          Age of Onset: (Not Specified)  Problem: No Known Problems      Relation: Paternal Uncle          Age of Onset: (Not Specified)  Problem: No  Known Problems      Relation: Maternal Grandmother          Age of Onset: (Not Specified)  Problem: No Known Problems      Relation: Maternal Grandfather          Age of Onset: (Not Specified)  Problem: No Known Problems      Relation: Paternal Grandmother          Age of Onset: (Not Specified)  Problem: No Known Problems      Relation: Paternal Grandfather          Age of Onset: (Not Specified)  Problem: Amblyopia      Relation: Neg Hx          Age of Onset: (Not Specified)  Problem: Blindness      Relation: Neg Hx          Age of Onset: (Not Specified)  Problem: Cancer      Relation: Neg Hx          Age of Onset: (Not Specified)  Problem: Cataracts      Relation: Neg Hx          Age of Onset: (Not Specified)  Problem: Glaucoma      Relation: Neg Hx          Age of Onset: (Not Specified)  Problem: Macular degeneration      Relation: Neg Hx          Age of Onset: (Not Specified)  Problem: Retinal detachment      Relation: Neg Hx          Age of Onset: (Not Specified)  Problem: Strabismus      Relation: Neg Hx          Age of Onset: (Not Specified)  Problem: Stroke      Relation: Neg Hx          Age of Onset: (Not Specified)  Problem: Thyroid disease      Relation: Neg Hx          Age of Onset: (Not Specified)    Social History    Socioeconomic History      Marital status:       Spouse name: Not on file      Number of children: Not on file      Years of education: Not on file      Highest education level: Not on file    Occupational History      Not on file    Social Needs      Financial resource strain: Not on file      Food insecurity        Worry: Not on file        Inability: Not on file      Transportation needs        Medical: Not on file        Non-medical: Not on file    Tobacco Use      Smoking status: Current Some Day Smoker        Packs/day: 0.25        Types: Cigarettes        Quit date: 3/21/2014        Years since quittin.3      Smokeless tobacco: Never Used      Tobacco comment: on chantix  "    Substance and Sexual Activity      Alcohol use: Yes        Alcohol/week: 0.0 standard drinks        Comment: every other weekend, 2-3 beers      Drug use: No      Sexual activity: Yes        Partners: Female        Comment:     Lifestyle      Physical activity        Days per week: Not on file        Minutes per session: Not on file      Stress: Not on file    Relationships      Social connections        Talks on phone: Patient refused        Gets together: Patient refused        Attends Holiness service: Not on file        Active member of club or organization: Patient refused        Attends meetings of clubs or organizations: Patient refused        Relationship status:     Other Topics      Concerns:        Not on file    Social History Narrative      Not on file      "This note will not be shared with the patient."  Review of Systems   Constitutional: Negative for activity change and unexpected weight change.   HENT: Negative for hearing loss, rhinorrhea and trouble swallowing.    Eyes: Positive for visual disturbance. Negative for discharge.   Respiratory: Negative for chest tightness and wheezing.    Cardiovascular: Negative for chest pain and palpitations.   Gastrointestinal: Negative for blood in stool, constipation, diarrhea and vomiting.   Endocrine: Negative for polydipsia and polyuria.   Genitourinary: Negative for difficulty urinating, hematuria and urgency.   Musculoskeletal: Negative for arthralgias, joint swelling and neck pain.   Neurological: Negative for weakness and headaches.   Psychiatric/Behavioral: Negative for confusion and dysphoric mood.         Objective:       Vitals:    07/29/20 1551   BP: 110/82   Pulse: 82   Temp: 98.5 °F (36.9 °C)   TempSrc: Oral   SpO2: 96%   Weight: 99 kg (218 lb 4.1 oz)   Height: 6' 2" (1.88 m)       Physical Exam  Constitutional:       General: He is not in acute distress.     Appearance: He is well-developed. He is not diaphoretic.   HENT:      " Head: Normocephalic and atraumatic.   Eyes:      Conjunctiva/sclera: Conjunctivae normal.   Neck:      Musculoskeletal: Normal range of motion and neck supple.      Vascular: No carotid bruit.   Cardiovascular:      Rate and Rhythm: Normal rate and regular rhythm.      Heart sounds: Normal heart sounds. No murmur. No friction rub. No gallop.    Pulmonary:      Effort: Pulmonary effort is normal. No respiratory distress.      Breath sounds: Normal breath sounds. No stridor. No wheezing, rhonchi or rales.   Chest:      Chest wall: No tenderness.   Lymphadenopathy:      Cervical: No cervical adenopathy.   Neurological:      Mental Status: He is alert and oriented to person, place, and time.         Assessment:       1. Hyperlipidemia, unspecified hyperlipidemia type    2. Insomnia, unspecified type    3. Screening for prostate cancer    4. Encounter for hepatitis C screening test for low risk patient    5. Encounter for HIV counseling    6. Encounter for screening for HIV        Plan:       Moreno was seen today for follow up/ renew medications.    Diagnoses and all orders for this visit:    Hyperlipidemia, unspecified hyperlipidemia type  -     CBC auto differential; Future  -     Comprehensive metabolic panel; Future  -     Lipid Panel; Future    Insomnia, unspecified type  -     traZODone (DESYREL) 50 MG tablet; Take 2 tablets (100 mg total) by mouth every evening.  Stable. Refilled meds.     Screening for prostate cancer  -     PSA, Screening; Future    Encounter for hepatitis C screening test for low risk patient  -     HEPATITIS C ANTIBODY; Future    Encounter for screening for HIV  -     HIV 1/2 Ag/Ab (4th Gen); Future    Other orders  -     Cancel: Lipid Panel; Future  -     Cancel: Hepatitis C Antibody; Future         advised that I cannot provide a letter that he doesn't have to wear a mask.

## 2020-07-29 NOTE — PROGRESS NOTES
Answers for HPI/ROS submitted by the patient on 7/29/2020   activity change: No  unexpected weight change: No  neck pain: No  hearing loss: No  rhinorrhea: No  trouble swallowing: No  eye discharge: No  visual disturbance: Yes  chest tightness: No  wheezing: No  chest pain: No  palpitations: No  blood in stool: No  constipation: No  vomiting: No  diarrhea: No  polydipsia: No  polyuria: No  difficulty urinating: No  urgency: No  hematuria: No  joint swelling: No  arthralgias: No  headaches: No  weakness: No  confusion: No  dysphoric mood: No

## 2020-07-30 ENCOUNTER — LAB VISIT (OUTPATIENT)
Dept: LAB | Facility: HOSPITAL | Age: 50
End: 2020-07-30
Attending: FAMILY MEDICINE
Payer: MEDICARE

## 2020-07-30 ENCOUNTER — PATIENT MESSAGE (OUTPATIENT)
Dept: FAMILY MEDICINE | Facility: CLINIC | Age: 50
End: 2020-07-30

## 2020-07-30 DIAGNOSIS — E78.5 HYPERLIPIDEMIA, UNSPECIFIED HYPERLIPIDEMIA TYPE: ICD-10-CM

## 2020-07-30 DIAGNOSIS — Z11.4 ENCOUNTER FOR SCREENING FOR HIV: ICD-10-CM

## 2020-07-30 DIAGNOSIS — Z11.59 ENCOUNTER FOR HEPATITIS C SCREENING TEST FOR LOW RISK PATIENT: ICD-10-CM

## 2020-07-30 DIAGNOSIS — Z12.5 SCREENING FOR PROSTATE CANCER: ICD-10-CM

## 2020-07-30 LAB
ALBUMIN SERPL BCP-MCNC: 4.1 G/DL (ref 3.5–5.2)
ALP SERPL-CCNC: 58 U/L (ref 55–135)
ALT SERPL W/O P-5'-P-CCNC: 23 U/L (ref 10–44)
ANION GAP SERPL CALC-SCNC: 8 MMOL/L (ref 8–16)
AST SERPL-CCNC: 23 U/L (ref 10–40)
BASOPHILS # BLD AUTO: 0.03 K/UL (ref 0–0.2)
BASOPHILS NFR BLD: 0.5 % (ref 0–1.9)
BILIRUB SERPL-MCNC: 0.4 MG/DL (ref 0.1–1)
BUN SERPL-MCNC: 30 MG/DL (ref 6–20)
CALCIUM SERPL-MCNC: 8.9 MG/DL (ref 8.7–10.5)
CHLORIDE SERPL-SCNC: 107 MMOL/L (ref 95–110)
CHOLEST SERPL-MCNC: 130 MG/DL (ref 120–199)
CHOLEST/HDLC SERPL: 3.3 {RATIO} (ref 2–5)
CO2 SERPL-SCNC: 26 MMOL/L (ref 23–29)
COMPLEXED PSA SERPL-MCNC: 0.47 NG/ML (ref 0–4)
CREAT SERPL-MCNC: 0.8 MG/DL (ref 0.5–1.4)
DIFFERENTIAL METHOD: ABNORMAL
EOSINOPHIL # BLD AUTO: 0.5 K/UL (ref 0–0.5)
EOSINOPHIL NFR BLD: 8.3 % (ref 0–8)
ERYTHROCYTE [DISTWIDTH] IN BLOOD BY AUTOMATED COUNT: 12.2 % (ref 11.5–14.5)
EST. GFR  (AFRICAN AMERICAN): >60 ML/MIN/1.73 M^2
EST. GFR  (NON AFRICAN AMERICAN): >60 ML/MIN/1.73 M^2
GLUCOSE SERPL-MCNC: 116 MG/DL (ref 70–110)
HCT VFR BLD AUTO: 43 % (ref 40–54)
HDLC SERPL-MCNC: 39 MG/DL (ref 40–75)
HDLC SERPL: 30 % (ref 20–50)
HGB BLD-MCNC: 14.4 G/DL (ref 14–18)
IMM GRANULOCYTES # BLD AUTO: 0.01 K/UL (ref 0–0.04)
IMM GRANULOCYTES NFR BLD AUTO: 0.2 % (ref 0–0.5)
LDLC SERPL CALC-MCNC: 61.2 MG/DL (ref 63–159)
LYMPHOCYTES # BLD AUTO: 1.8 K/UL (ref 1–4.8)
LYMPHOCYTES NFR BLD: 31.8 % (ref 18–48)
MCH RBC QN AUTO: 30.4 PG (ref 27–31)
MCHC RBC AUTO-ENTMCNC: 33.5 G/DL (ref 32–36)
MCV RBC AUTO: 91 FL (ref 82–98)
MONOCYTES # BLD AUTO: 0.6 K/UL (ref 0.3–1)
MONOCYTES NFR BLD: 10.6 % (ref 4–15)
NEUTROPHILS # BLD AUTO: 2.8 K/UL (ref 1.8–7.7)
NEUTROPHILS NFR BLD: 48.6 % (ref 38–73)
NONHDLC SERPL-MCNC: 91 MG/DL
NRBC BLD-RTO: 0 /100 WBC
PLATELET # BLD AUTO: 208 K/UL (ref 150–350)
PMV BLD AUTO: 11.1 FL (ref 9.2–12.9)
POTASSIUM SERPL-SCNC: 3.9 MMOL/L (ref 3.5–5.1)
PROT SERPL-MCNC: 6.4 G/DL (ref 6–8.4)
RBC # BLD AUTO: 4.74 M/UL (ref 4.6–6.2)
SODIUM SERPL-SCNC: 141 MMOL/L (ref 136–145)
TRIGL SERPL-MCNC: 149 MG/DL (ref 30–150)
WBC # BLD AUTO: 5.75 K/UL (ref 3.9–12.7)

## 2020-07-30 PROCEDURE — 84153 ASSAY OF PSA TOTAL: CPT

## 2020-07-30 PROCEDURE — 86803 HEPATITIS C AB TEST: CPT

## 2020-07-30 PROCEDURE — 80053 COMPREHEN METABOLIC PANEL: CPT

## 2020-07-30 PROCEDURE — 80061 LIPID PANEL: CPT

## 2020-07-30 PROCEDURE — 86703 HIV-1/HIV-2 1 RESULT ANTBDY: CPT

## 2020-07-30 PROCEDURE — 85025 COMPLETE CBC W/AUTO DIFF WBC: CPT

## 2020-07-31 LAB
HCV AB SERPL QL IA: NEGATIVE
HIV 1+2 AB+HIV1 P24 AG SERPL QL IA: NEGATIVE

## 2020-09-16 ENCOUNTER — PATIENT MESSAGE (OUTPATIENT)
Dept: FAMILY MEDICINE | Facility: CLINIC | Age: 50
End: 2020-09-16

## 2020-09-16 RX ORDER — CITALOPRAM 40 MG/1
40 TABLET, FILM COATED ORAL DAILY
Qty: 30 TABLET | Refills: 3 | Status: SHIPPED | OUTPATIENT
Start: 2020-09-16 | End: 2020-11-30

## 2020-09-16 RX ORDER — CITALOPRAM 40 MG/1
40 TABLET, FILM COATED ORAL DAILY
COMMUNITY
End: 2020-09-16 | Stop reason: SDUPTHER

## 2020-10-05 ENCOUNTER — PATIENT MESSAGE (OUTPATIENT)
Dept: ADMINISTRATIVE | Facility: HOSPITAL | Age: 50
End: 2020-10-05

## 2020-11-04 ENCOUNTER — PATIENT MESSAGE (OUTPATIENT)
Dept: ADMINISTRATIVE | Facility: HOSPITAL | Age: 50
End: 2020-11-04

## 2021-01-05 ENCOUNTER — PATIENT MESSAGE (OUTPATIENT)
Dept: ADMINISTRATIVE | Facility: HOSPITAL | Age: 51
End: 2021-01-05

## 2021-01-07 ENCOUNTER — PATIENT OUTREACH (OUTPATIENT)
Dept: ADMINISTRATIVE | Facility: HOSPITAL | Age: 51
End: 2021-01-07

## 2021-01-07 DIAGNOSIS — Z12.11 SCREENING FOR MALIGNANT NEOPLASM OF COLON: Primary | ICD-10-CM

## 2021-01-21 ENCOUNTER — PATIENT MESSAGE (OUTPATIENT)
Dept: FAMILY MEDICINE | Facility: CLINIC | Age: 51
End: 2021-01-21

## 2021-01-21 DIAGNOSIS — G47.00 INSOMNIA, UNSPECIFIED TYPE: Primary | ICD-10-CM

## 2021-01-22 RX ORDER — TRAZODONE HYDROCHLORIDE 50 MG/1
50 TABLET ORAL NIGHTLY
Qty: 30 TABLET | Refills: 11 | Status: SHIPPED | OUTPATIENT
Start: 2021-01-22 | End: 2021-02-22 | Stop reason: SDUPTHER

## 2021-02-01 LAB — GASTROCULT GAST QL: NEGATIVE

## 2021-02-08 ENCOUNTER — PATIENT OUTREACH (OUTPATIENT)
Dept: ADMINISTRATIVE | Facility: HOSPITAL | Age: 51
End: 2021-02-08

## 2021-02-22 DIAGNOSIS — G47.00 INSOMNIA, UNSPECIFIED TYPE: ICD-10-CM

## 2021-02-23 RX ORDER — TRAZODONE HYDROCHLORIDE 50 MG/1
50 TABLET ORAL NIGHTLY
Qty: 30 TABLET | Refills: 11 | Status: SHIPPED | OUTPATIENT
Start: 2021-02-23 | End: 2021-04-22 | Stop reason: SDUPTHER

## 2021-02-28 ENCOUNTER — PATIENT MESSAGE (OUTPATIENT)
Dept: FAMILY MEDICINE | Facility: CLINIC | Age: 51
End: 2021-02-28

## 2021-02-28 DIAGNOSIS — F41.1 GAD (GENERALIZED ANXIETY DISORDER): ICD-10-CM

## 2021-03-01 RX ORDER — BUSPIRONE HYDROCHLORIDE 7.5 MG/1
TABLET ORAL
Qty: 180 TABLET | Refills: 0 | Status: SHIPPED | OUTPATIENT
Start: 2021-03-01 | End: 2021-03-19 | Stop reason: SDUPTHER

## 2021-03-19 ENCOUNTER — OFFICE VISIT (OUTPATIENT)
Dept: FAMILY MEDICINE | Facility: CLINIC | Age: 51
End: 2021-03-19
Payer: MEDICARE

## 2021-03-19 VITALS
WEIGHT: 213.19 LBS | HEIGHT: 74 IN | BODY MASS INDEX: 27.36 KG/M2 | SYSTOLIC BLOOD PRESSURE: 124 MMHG | HEART RATE: 84 BPM | DIASTOLIC BLOOD PRESSURE: 82 MMHG | OXYGEN SATURATION: 99 %

## 2021-03-19 DIAGNOSIS — E78.5 HYPERLIPIDEMIA, UNSPECIFIED HYPERLIPIDEMIA TYPE: Primary | ICD-10-CM

## 2021-03-19 DIAGNOSIS — F41.1 GAD (GENERALIZED ANXIETY DISORDER): ICD-10-CM

## 2021-03-19 PROCEDURE — 99999 PR PBB SHADOW E&M-EST. PATIENT-LVL IV: CPT | Mod: PBBFAC,,, | Performed by: FAMILY MEDICINE

## 2021-03-19 PROCEDURE — 99999 PR PBB SHADOW E&M-EST. PATIENT-LVL IV: ICD-10-PCS | Mod: PBBFAC,,, | Performed by: FAMILY MEDICINE

## 2021-03-19 PROCEDURE — 99214 OFFICE O/P EST MOD 30 MIN: CPT | Mod: PBBFAC,PO | Performed by: FAMILY MEDICINE

## 2021-03-19 PROCEDURE — 99214 PR OFFICE/OUTPT VISIT, EST, LEVL IV, 30-39 MIN: ICD-10-PCS | Mod: S$PBB,,, | Performed by: FAMILY MEDICINE

## 2021-03-19 PROCEDURE — 99214 OFFICE O/P EST MOD 30 MIN: CPT | Mod: S$PBB,,, | Performed by: FAMILY MEDICINE

## 2021-03-19 RX ORDER — CYCLOBENZAPRINE HCL 10 MG
10 TABLET ORAL NIGHTLY
COMMUNITY
Start: 2021-03-01

## 2021-03-19 RX ORDER — BUSPIRONE HYDROCHLORIDE 7.5 MG/1
TABLET ORAL
Qty: 180 TABLET | Refills: 1 | Status: SHIPPED | OUTPATIENT
Start: 2021-03-19 | End: 2021-09-28

## 2021-03-19 RX ORDER — ROSUVASTATIN CALCIUM 40 MG/1
40 TABLET, COATED ORAL NIGHTLY
Qty: 90 TABLET | Refills: 3 | Status: SHIPPED | OUTPATIENT
Start: 2021-03-19 | End: 2022-02-14

## 2021-03-19 RX ORDER — ESCITALOPRAM OXALATE 20 MG/1
20 TABLET ORAL DAILY
Qty: 90 TABLET | Refills: 3 | Status: SHIPPED | OUTPATIENT
Start: 2021-03-19 | End: 2021-03-19

## 2021-03-19 RX ORDER — CITALOPRAM 40 MG/1
40 TABLET, FILM COATED ORAL DAILY
Qty: 90 TABLET | Refills: 3 | Status: SHIPPED | OUTPATIENT
Start: 2021-03-19 | End: 2022-02-07

## 2021-03-25 ENCOUNTER — LAB VISIT (OUTPATIENT)
Dept: LAB | Facility: HOSPITAL | Age: 51
End: 2021-03-25
Attending: FAMILY MEDICINE
Payer: MEDICARE

## 2021-03-25 DIAGNOSIS — E78.5 HYPERLIPIDEMIA, UNSPECIFIED HYPERLIPIDEMIA TYPE: ICD-10-CM

## 2021-03-25 LAB
ALBUMIN SERPL BCP-MCNC: 3.6 G/DL (ref 3.5–5.2)
ALP SERPL-CCNC: 49 U/L (ref 55–135)
ALT SERPL W/O P-5'-P-CCNC: 19 U/L (ref 10–44)
ANION GAP SERPL CALC-SCNC: 8 MMOL/L (ref 8–16)
AST SERPL-CCNC: 20 U/L (ref 10–40)
BASOPHILS # BLD AUTO: 0.01 K/UL (ref 0–0.2)
BASOPHILS NFR BLD: 0.2 % (ref 0–1.9)
BILIRUB SERPL-MCNC: 0.4 MG/DL (ref 0.1–1)
BUN SERPL-MCNC: 23 MG/DL (ref 6–20)
CALCIUM SERPL-MCNC: 9.1 MG/DL (ref 8.7–10.5)
CHLORIDE SERPL-SCNC: 109 MMOL/L (ref 95–110)
CHOLEST SERPL-MCNC: 211 MG/DL (ref 120–199)
CHOLEST/HDLC SERPL: 4.1 {RATIO} (ref 2–5)
CO2 SERPL-SCNC: 25 MMOL/L (ref 23–29)
CREAT SERPL-MCNC: 0.8 MG/DL (ref 0.5–1.4)
DIFFERENTIAL METHOD: ABNORMAL
EOSINOPHIL # BLD AUTO: 0.2 K/UL (ref 0–0.5)
EOSINOPHIL NFR BLD: 3.3 % (ref 0–8)
ERYTHROCYTE [DISTWIDTH] IN BLOOD BY AUTOMATED COUNT: 13.1 % (ref 11.5–14.5)
EST. GFR  (AFRICAN AMERICAN): >60 ML/MIN/1.73 M^2
EST. GFR  (NON AFRICAN AMERICAN): >60 ML/MIN/1.73 M^2
GLUCOSE SERPL-MCNC: 114 MG/DL (ref 70–110)
HCT VFR BLD AUTO: 39.8 % (ref 40–54)
HDLC SERPL-MCNC: 52 MG/DL (ref 40–75)
HDLC SERPL: 24.6 % (ref 20–50)
HGB BLD-MCNC: 13.2 G/DL (ref 14–18)
IMM GRANULOCYTES # BLD AUTO: 0.01 K/UL (ref 0–0.04)
IMM GRANULOCYTES NFR BLD AUTO: 0.2 % (ref 0–0.5)
LDLC SERPL CALC-MCNC: 132.8 MG/DL (ref 63–159)
LYMPHOCYTES # BLD AUTO: 1.8 K/UL (ref 1–4.8)
LYMPHOCYTES NFR BLD: 26.8 % (ref 18–48)
MCH RBC QN AUTO: 31.1 PG (ref 27–31)
MCHC RBC AUTO-ENTMCNC: 33.2 G/DL (ref 32–36)
MCV RBC AUTO: 94 FL (ref 82–98)
MONOCYTES # BLD AUTO: 0.6 K/UL (ref 0.3–1)
MONOCYTES NFR BLD: 8.4 % (ref 4–15)
NEUTROPHILS # BLD AUTO: 4.1 K/UL (ref 1.8–7.7)
NEUTROPHILS NFR BLD: 61.1 % (ref 38–73)
NONHDLC SERPL-MCNC: 159 MG/DL
NRBC BLD-RTO: 0 /100 WBC
PLATELET # BLD AUTO: 215 K/UL (ref 150–350)
PMV BLD AUTO: 11.4 FL (ref 9.2–12.9)
POTASSIUM SERPL-SCNC: 3.7 MMOL/L (ref 3.5–5.1)
PROT SERPL-MCNC: 6.3 G/DL (ref 6–8.4)
RBC # BLD AUTO: 4.25 M/UL (ref 4.6–6.2)
SODIUM SERPL-SCNC: 142 MMOL/L (ref 136–145)
TRIGL SERPL-MCNC: 131 MG/DL (ref 30–150)
WBC # BLD AUTO: 6.65 K/UL (ref 3.9–12.7)

## 2021-03-25 PROCEDURE — 85025 COMPLETE CBC W/AUTO DIFF WBC: CPT | Performed by: FAMILY MEDICINE

## 2021-03-25 PROCEDURE — 80061 LIPID PANEL: CPT | Performed by: FAMILY MEDICINE

## 2021-03-25 PROCEDURE — 80053 COMPREHEN METABOLIC PANEL: CPT | Performed by: FAMILY MEDICINE

## 2021-03-26 ENCOUNTER — PATIENT MESSAGE (OUTPATIENT)
Dept: FAMILY MEDICINE | Facility: CLINIC | Age: 51
End: 2021-03-26

## 2021-03-31 ENCOUNTER — PATIENT MESSAGE (OUTPATIENT)
Dept: FAMILY MEDICINE | Facility: CLINIC | Age: 51
End: 2021-03-31

## 2021-04-21 ENCOUNTER — PATIENT MESSAGE (OUTPATIENT)
Dept: FAMILY MEDICINE | Facility: CLINIC | Age: 51
End: 2021-04-21

## 2021-04-21 DIAGNOSIS — G47.00 INSOMNIA, UNSPECIFIED TYPE: ICD-10-CM

## 2021-04-22 RX ORDER — TRAZODONE HYDROCHLORIDE 50 MG/1
100 TABLET ORAL NIGHTLY
Qty: 180 TABLET | Refills: 3 | Status: SHIPPED | OUTPATIENT
Start: 2021-04-22 | End: 2022-02-07

## 2021-07-17 ENCOUNTER — HOSPITAL ENCOUNTER (EMERGENCY)
Facility: HOSPITAL | Age: 51
Discharge: HOME | End: 2021-07-17
Attending: EMERGENCY MEDICINE
Payer: MEDICARE

## 2021-07-17 VITALS
WEIGHT: 210 LBS | BODY MASS INDEX: 26.95 KG/M2 | HEART RATE: 74 BPM | DIASTOLIC BLOOD PRESSURE: 74 MMHG | HEIGHT: 74 IN | TEMPERATURE: 98 F | SYSTOLIC BLOOD PRESSURE: 123 MMHG | RESPIRATION RATE: 18 BRPM | OXYGEN SATURATION: 96 %
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
## 2021-07-17 DIAGNOSIS — S52.502A CLOSED FRACTURE OF DISTAL END OF LEFT RADIUS, UNSPECIFIED FRACTURE MORPHOLOGY, INITIAL ENCOUNTER: Primary | ICD-10-CM

## 2021-07-17 PROCEDURE — 96372 THER/PROPH/DIAG INJ SC/IM: CPT | Mod: 59

## 2021-07-17 PROCEDURE — 63600175 PHARM REV CODE 636 W HCPCS: Performed by: EMERGENCY MEDICINE

## 2021-07-17 PROCEDURE — 29105 APPLICATION LONG ARM SPLINT: CPT | Mod: 59,LT

## 2021-07-17 PROCEDURE — 99284 EMERGENCY DEPT VISIT MOD MDM: CPT | Mod: 25

## 2021-07-17 RX ORDER — MORPHINE SULFATE 4 MG/ML
4 INJECTION, SOLUTION INTRAMUSCULAR; INTRAVENOUS
Status: COMPLETED | OUTPATIENT
Start: 2021-07-17 | End: 2021-07-17

## 2021-07-17 RX ORDER — OXYCODONE AND ACETAMINOPHEN 10; 325 MG/1; MG/1
1 TABLET ORAL EVERY 4 HOURS PRN
Qty: 18 TABLET | Refills: 0 | Status: SHIPPED | OUTPATIENT
Start: 2021-07-17

## 2021-07-17 RX ADMIN — MORPHINE SULFATE 4 MG: 4 INJECTION INTRAVENOUS at 01:07

## 2021-07-17 RX ADMIN — MORPHINE SULFATE 4 MG: 4 INJECTION INTRAVENOUS at 03:07

## 2021-07-19 ENCOUNTER — ANESTHESIA EVENT (OUTPATIENT)
Dept: SURGERY | Facility: HOSPITAL | Age: 51
End: 2021-07-19
Payer: MEDICARE

## 2021-07-19 ENCOUNTER — OFFICE VISIT (OUTPATIENT)
Dept: ORTHOPEDICS | Facility: CLINIC | Age: 51
End: 2021-07-19
Payer: MEDICARE

## 2021-07-19 ENCOUNTER — HOSPITAL ENCOUNTER (OUTPATIENT)
Dept: RADIOLOGY | Facility: HOSPITAL | Age: 51
Discharge: HOME | End: 2021-07-19
Attending: ORTHOPAEDIC SURGERY
Payer: MEDICARE

## 2021-07-19 ENCOUNTER — HOSPITAL ENCOUNTER (OUTPATIENT)
Dept: PREADMISSION TESTING | Facility: HOSPITAL | Age: 51
Discharge: HOME | End: 2021-07-19
Attending: ORTHOPAEDIC SURGERY
Payer: MEDICARE

## 2021-07-19 VITALS
OXYGEN SATURATION: 94 % | HEART RATE: 89 BPM | DIASTOLIC BLOOD PRESSURE: 80 MMHG | RESPIRATION RATE: 18 BRPM | HEIGHT: 74 IN | WEIGHT: 222.44 LBS | BODY MASS INDEX: 28.55 KG/M2 | SYSTOLIC BLOOD PRESSURE: 118 MMHG

## 2021-07-19 VITALS — BODY MASS INDEX: 28.55 KG/M2 | WEIGHT: 222.44 LBS | HEIGHT: 74 IN

## 2021-07-19 DIAGNOSIS — Z01.818 PREOP TESTING: Primary | ICD-10-CM

## 2021-07-19 DIAGNOSIS — S52.532A CLOSED COLLES' FRACTURE OF LEFT RADIUS, INITIAL ENCOUNTER: Primary | ICD-10-CM

## 2021-07-19 DIAGNOSIS — S52.502A CLOSED FRACTURE OF DISTAL END OF LEFT RADIUS, UNSPECIFIED FRACTURE MORPHOLOGY, INITIAL ENCOUNTER: ICD-10-CM

## 2021-07-19 DIAGNOSIS — S52.532A CLOSED COLLES' FRACTURE OF LEFT RADIUS, INITIAL ENCOUNTER: ICD-10-CM

## 2021-07-19 DIAGNOSIS — S52.532A CLOSED COLLES' FRACTURE OF LEFT RADIUS: ICD-10-CM

## 2021-07-19 LAB
ANION GAP SERPL CALC-SCNC: 4 MMOL/L (ref 8–16)
BASOPHILS # BLD AUTO: 0.04 K/UL (ref 0–0.2)
BASOPHILS NFR BLD: 0.6 % (ref 0–1.9)
BUN SERPL-MCNC: 22 MG/DL (ref 6–20)
CALCIUM SERPL-MCNC: 8.9 MG/DL (ref 8.7–10.5)
CHLORIDE SERPL-SCNC: 107 MMOL/L (ref 95–110)
CO2 SERPL-SCNC: 30 MMOL/L (ref 23–29)
CREAT SERPL-MCNC: 0.9 MG/DL (ref 0.5–1.4)
DIFFERENTIAL METHOD: ABNORMAL
EOSINOPHIL # BLD AUTO: 0.5 K/UL (ref 0–0.5)
EOSINOPHIL NFR BLD: 7.9 % (ref 0–8)
ERYTHROCYTE [DISTWIDTH] IN BLOOD BY AUTOMATED COUNT: 12.3 % (ref 11.5–14.5)
EST. GFR  (AFRICAN AMERICAN): >60 ML/MIN/1.73 M^2
EST. GFR  (NON AFRICAN AMERICAN): >60 ML/MIN/1.73 M^2
GLUCOSE SERPL-MCNC: 106 MG/DL (ref 70–110)
HCT VFR BLD AUTO: 37.2 % (ref 40–54)
HGB BLD-MCNC: 12.8 G/DL (ref 14–18)
IMM GRANULOCYTES # BLD AUTO: 0.01 K/UL (ref 0–0.04)
IMM GRANULOCYTES NFR BLD AUTO: 0.2 % (ref 0–0.5)
LYMPHOCYTES # BLD AUTO: 1.7 K/UL (ref 1–4.8)
LYMPHOCYTES NFR BLD: 27.1 % (ref 18–48)
MCH RBC QN AUTO: 31.9 PG (ref 27–31)
MCHC RBC AUTO-ENTMCNC: 34.4 G/DL (ref 32–36)
MCV RBC AUTO: 93 FL (ref 82–98)
MONOCYTES # BLD AUTO: 0.7 K/UL (ref 0.3–1)
MONOCYTES NFR BLD: 10.9 % (ref 4–15)
NEUTROPHILS # BLD AUTO: 3.4 K/UL (ref 1.8–7.7)
NEUTROPHILS NFR BLD: 53.3 % (ref 38–73)
NRBC BLD-RTO: 0 /100 WBC
PLATELET # BLD AUTO: 176 K/UL (ref 150–450)
PMV BLD AUTO: 10.6 FL (ref 9.2–12.9)
POTASSIUM SERPL-SCNC: 4.3 MMOL/L (ref 3.5–5.1)
RBC # BLD AUTO: 4.01 M/UL (ref 4.6–6.2)
SARS-COV-2 RDRP RESP QL NAA+PROBE: NEGATIVE
SODIUM SERPL-SCNC: 141 MMOL/L (ref 136–145)
WBC # BLD AUTO: 6.42 K/UL (ref 3.9–12.7)

## 2021-07-19 PROCEDURE — 73200 CT UPPER EXTREMITY W/O DYE: CPT | Mod: 26,LT,, | Performed by: RADIOLOGY

## 2021-07-19 PROCEDURE — 80048 BASIC METABOLIC PNL TOTAL CA: CPT | Performed by: ORTHOPAEDIC SURGERY

## 2021-07-19 PROCEDURE — 85025 COMPLETE CBC W/AUTO DIFF WBC: CPT | Performed by: ORTHOPAEDIC SURGERY

## 2021-07-19 PROCEDURE — 99999 PR PBB SHADOW E&M-EST. PATIENT-LVL V: CPT | Mod: PBBFAC,,, | Performed by: ORTHOPAEDIC SURGERY

## 2021-07-19 PROCEDURE — 93010 EKG 12-LEAD: ICD-10-PCS | Mod: ,,, | Performed by: INTERNAL MEDICINE

## 2021-07-19 PROCEDURE — U0002 COVID-19 LAB TEST NON-CDC: HCPCS | Performed by: ORTHOPAEDIC SURGERY

## 2021-07-19 PROCEDURE — 99204 OFFICE O/P NEW MOD 45 MIN: CPT | Mod: S$PBB,,, | Performed by: ORTHOPAEDIC SURGERY

## 2021-07-19 PROCEDURE — 73200 CT WRIST WITHOUT CONTRAST LEFT: ICD-10-PCS | Mod: 26,LT,, | Performed by: RADIOLOGY

## 2021-07-19 PROCEDURE — 36415 COLL VENOUS BLD VENIPUNCTURE: CPT | Performed by: ORTHOPAEDIC SURGERY

## 2021-07-19 PROCEDURE — 99999 PR PBB SHADOW E&M-EST. PATIENT-LVL V: ICD-10-PCS | Mod: PBBFAC,,, | Performed by: ORTHOPAEDIC SURGERY

## 2021-07-19 PROCEDURE — 93005 ELECTROCARDIOGRAM TRACING: CPT

## 2021-07-19 PROCEDURE — 93010 ELECTROCARDIOGRAM REPORT: CPT | Mod: ,,, | Performed by: INTERNAL MEDICINE

## 2021-07-19 PROCEDURE — 99204 PR OFFICE/OUTPT VISIT, NEW, LEVL IV, 45-59 MIN: ICD-10-PCS | Mod: S$PBB,,, | Performed by: ORTHOPAEDIC SURGERY

## 2021-07-19 PROCEDURE — 99215 OFFICE O/P EST HI 40 MIN: CPT | Mod: PBBFAC,PN | Performed by: ORTHOPAEDIC SURGERY

## 2021-07-19 PROCEDURE — 73200 CT UPPER EXTREMITY W/O DYE: CPT | Mod: TC,LT

## 2021-07-21 ENCOUNTER — HOSPITAL ENCOUNTER (OUTPATIENT)
Facility: HOSPITAL | Age: 51
Discharge: HOME | End: 2021-07-21
Attending: ORTHOPAEDIC SURGERY | Admitting: ORTHOPAEDIC SURGERY
Payer: MEDICARE

## 2021-07-21 ENCOUNTER — ANESTHESIA (OUTPATIENT)
Dept: SURGERY | Facility: HOSPITAL | Age: 51
End: 2021-07-21
Payer: MEDICARE

## 2021-07-21 VITALS
OXYGEN SATURATION: 95 % | DIASTOLIC BLOOD PRESSURE: 91 MMHG | SYSTOLIC BLOOD PRESSURE: 155 MMHG | TEMPERATURE: 99 F | RESPIRATION RATE: 16 BRPM | HEART RATE: 103 BPM

## 2021-07-21 DIAGNOSIS — Z01.818 PREOP TESTING: ICD-10-CM

## 2021-07-21 DIAGNOSIS — S52.532A CLOSED COLLES' FRACTURE OF LEFT RADIUS, INITIAL ENCOUNTER: Primary | ICD-10-CM

## 2021-07-21 DIAGNOSIS — S52.532A CLOSED COLLES' FRACTURE OF LEFT RADIUS: ICD-10-CM

## 2021-07-21 PROCEDURE — 27201423 OPTIME MED/SURG SUP & DEVICES STERILE SUPPLY: Performed by: ORTHOPAEDIC SURGERY

## 2021-07-21 PROCEDURE — D9220A PRA ANESTHESIA: ICD-10-PCS | Mod: CRNA,,, | Performed by: NURSE ANESTHETIST, CERTIFIED REGISTERED

## 2021-07-21 PROCEDURE — 63600175 PHARM REV CODE 636 W HCPCS: Performed by: ORTHOPAEDIC SURGERY

## 2021-07-21 PROCEDURE — D9220A PRA ANESTHESIA: ICD-10-PCS | Mod: ANES,,, | Performed by: ANESTHESIOLOGY

## 2021-07-21 PROCEDURE — 71000033 HC RECOVERY, INTIAL HOUR: Performed by: ORTHOPAEDIC SURGERY

## 2021-07-21 PROCEDURE — 25608 PR OPEN RX DISTAL RADIUS FX, INTRA-ARTICULAR, 2 FRAG: ICD-10-PCS | Mod: LT,,, | Performed by: ORTHOPAEDIC SURGERY

## 2021-07-21 PROCEDURE — 63600175 PHARM REV CODE 636 W HCPCS: Performed by: NURSE ANESTHETIST, CERTIFIED REGISTERED

## 2021-07-21 PROCEDURE — 25000003 PHARM REV CODE 250: Performed by: ANESTHESIOLOGY

## 2021-07-21 PROCEDURE — 37000008 HC ANESTHESIA 1ST 15 MINUTES: Performed by: ORTHOPAEDIC SURGERY

## 2021-07-21 PROCEDURE — 71000015 HC POSTOP RECOV 1ST HR: Performed by: ORTHOPAEDIC SURGERY

## 2021-07-21 PROCEDURE — 25652 PR OPEN RX ULNAR STYLOID FX: ICD-10-PCS | Mod: 51,LT,, | Performed by: ORTHOPAEDIC SURGERY

## 2021-07-21 PROCEDURE — 64415 PERIPHERAL BLOCK: ICD-10-PCS | Mod: 59,LT,, | Performed by: ANESTHESIOLOGY

## 2021-07-21 PROCEDURE — 76942 PERIPHERAL BLOCK: ICD-10-PCS | Mod: 26,,, | Performed by: ANESTHESIOLOGY

## 2021-07-21 PROCEDURE — D9220A PRA ANESTHESIA: Mod: ANES,,, | Performed by: ANESTHESIOLOGY

## 2021-07-21 PROCEDURE — 76942 ECHO GUIDE FOR BIOPSY: CPT | Mod: 26,,, | Performed by: ANESTHESIOLOGY

## 2021-07-21 PROCEDURE — 63600175 PHARM REV CODE 636 W HCPCS: Performed by: ANESTHESIOLOGY

## 2021-07-21 PROCEDURE — 25652 OPTX ULNAR STYLOID FRACTURE: CPT | Mod: 51,LT,, | Performed by: ORTHOPAEDIC SURGERY

## 2021-07-21 PROCEDURE — 37000009 HC ANESTHESIA EA ADD 15 MINS: Performed by: ORTHOPAEDIC SURGERY

## 2021-07-21 PROCEDURE — 71000039 HC RECOVERY, EACH ADD'L HOUR: Performed by: ORTHOPAEDIC SURGERY

## 2021-07-21 PROCEDURE — 25000003 PHARM REV CODE 250: Performed by: NURSE ANESTHETIST, CERTIFIED REGISTERED

## 2021-07-21 PROCEDURE — 36000711: Performed by: ORTHOPAEDIC SURGERY

## 2021-07-21 PROCEDURE — 25000003 PHARM REV CODE 250: Performed by: ORTHOPAEDIC SURGERY

## 2021-07-21 PROCEDURE — 25608 OPTX DST RD XART FX/EPI SEP2: CPT | Mod: LT,,, | Performed by: ORTHOPAEDIC SURGERY

## 2021-07-21 PROCEDURE — D9220A PRA ANESTHESIA: Mod: CRNA,,, | Performed by: NURSE ANESTHETIST, CERTIFIED REGISTERED

## 2021-07-21 PROCEDURE — 36000710: Performed by: ORTHOPAEDIC SURGERY

## 2021-07-21 PROCEDURE — C1713 ANCHOR/SCREW BN/BN,TIS/BN: HCPCS | Performed by: ORTHOPAEDIC SURGERY

## 2021-07-21 PROCEDURE — C1776 JOINT DEVICE (IMPLANTABLE): HCPCS | Performed by: ORTHOPAEDIC SURGERY

## 2021-07-21 PROCEDURE — 64415 NJX AA&/STRD BRCH PLXS IMG: CPT | Mod: 59 | Performed by: ANESTHESIOLOGY

## 2021-07-21 DEVICE — IMPLANTABLE DEVICE: Type: IMPLANTABLE DEVICE | Site: RADIUS | Status: FUNCTIONAL

## 2021-07-21 DEVICE — SCREW GEMINUS NLOK 3.5X12MM: Type: IMPLANTABLE DEVICE | Site: RADIUS | Status: FUNCTIONAL

## 2021-07-21 RX ORDER — DEXAMETHASONE SODIUM PHOSPHATE 4 MG/ML
INJECTION, SOLUTION INTRA-ARTICULAR; INTRALESIONAL; INTRAMUSCULAR; INTRAVENOUS; SOFT TISSUE
Status: DISCONTINUED | OUTPATIENT
Start: 2021-07-21 | End: 2021-07-21

## 2021-07-21 RX ORDER — FENTANYL CITRATE 50 UG/ML
INJECTION, SOLUTION INTRAMUSCULAR; INTRAVENOUS
Status: DISCONTINUED | OUTPATIENT
Start: 2021-07-21 | End: 2021-07-21

## 2021-07-21 RX ORDER — PHENYLEPHRINE HYDROCHLORIDE 10 MG/ML
INJECTION INTRAVENOUS
Status: DISCONTINUED | OUTPATIENT
Start: 2021-07-21 | End: 2021-07-21

## 2021-07-21 RX ORDER — SODIUM CHLORIDE, SODIUM LACTATE, POTASSIUM CHLORIDE, CALCIUM CHLORIDE 600; 310; 30; 20 MG/100ML; MG/100ML; MG/100ML; MG/100ML
INJECTION, SOLUTION INTRAVENOUS CONTINUOUS
Status: DISCONTINUED | OUTPATIENT
Start: 2021-07-21 | End: 2021-07-21 | Stop reason: HOSPADM

## 2021-07-21 RX ORDER — ONDANSETRON 2 MG/ML
INJECTION INTRAMUSCULAR; INTRAVENOUS
Status: DISCONTINUED | OUTPATIENT
Start: 2021-07-21 | End: 2021-07-21

## 2021-07-21 RX ORDER — BUPIVACAINE HYDROCHLORIDE 5 MG/ML
INJECTION, SOLUTION EPIDURAL; INTRACAUDAL
Status: DISCONTINUED | OUTPATIENT
Start: 2021-07-21 | End: 2021-07-21

## 2021-07-21 RX ORDER — ACETAMINOPHEN 500 MG
1000 TABLET ORAL ONCE
Status: DISCONTINUED | OUTPATIENT
Start: 2021-07-21 | End: 2021-07-21 | Stop reason: HOSPADM

## 2021-07-21 RX ORDER — ACETAMINOPHEN 10 MG/ML
1000 INJECTION, SOLUTION INTRAVENOUS ONCE
Status: DISCONTINUED | OUTPATIENT
Start: 2021-07-21 | End: 2021-07-21

## 2021-07-21 RX ORDER — ONDANSETRON 4 MG/1
4 TABLET, ORALLY DISINTEGRATING ORAL EVERY 6 HOURS PRN
Qty: 10 TABLET | Refills: 0 | Status: SHIPPED | OUTPATIENT
Start: 2021-07-21

## 2021-07-21 RX ORDER — ACETAMINOPHEN 10 MG/ML
1000 INJECTION, SOLUTION INTRAVENOUS ONCE
Status: COMPLETED | OUTPATIENT
Start: 2021-07-21 | End: 2021-07-21

## 2021-07-21 RX ORDER — KETOROLAC TROMETHAMINE 30 MG/ML
15 INJECTION, SOLUTION INTRAMUSCULAR; INTRAVENOUS EVERY 8 HOURS PRN
Status: DISCONTINUED | OUTPATIENT
Start: 2021-07-21 | End: 2021-07-21 | Stop reason: HOSPADM

## 2021-07-21 RX ORDER — EPHEDRINE SULFATE 50 MG/ML
INJECTION, SOLUTION INTRAVENOUS
Status: DISCONTINUED | OUTPATIENT
Start: 2021-07-21 | End: 2021-07-21

## 2021-07-21 RX ORDER — OXYCODONE HYDROCHLORIDE 10 MG/1
10 TABLET ORAL EVERY 4 HOURS PRN
Qty: 42 TABLET | Refills: 0 | Status: SHIPPED | OUTPATIENT
Start: 2021-07-21 | End: 2021-07-28

## 2021-07-21 RX ORDER — HYDROMORPHONE HYDROCHLORIDE 2 MG/ML
0.2 INJECTION, SOLUTION INTRAMUSCULAR; INTRAVENOUS; SUBCUTANEOUS EVERY 5 MIN PRN
Status: COMPLETED | OUTPATIENT
Start: 2021-07-21 | End: 2021-07-21

## 2021-07-21 RX ORDER — SODIUM CHLORIDE 0.9 % (FLUSH) 0.9 %
10 SYRINGE (ML) INJECTION
Status: DISCONTINUED | OUTPATIENT
Start: 2021-07-21 | End: 2021-07-21 | Stop reason: HOSPADM

## 2021-07-21 RX ORDER — CEFAZOLIN SODIUM 2 G/50ML
2 SOLUTION INTRAVENOUS
Status: COMPLETED | OUTPATIENT
Start: 2021-07-21 | End: 2021-07-21

## 2021-07-21 RX ORDER — PROPOFOL 10 MG/ML
INJECTION, EMULSION INTRAVENOUS
Status: DISCONTINUED | OUTPATIENT
Start: 2021-07-21 | End: 2021-07-21

## 2021-07-21 RX ORDER — LIDOCAINE HCL/PF 100 MG/5ML
SYRINGE (ML) INTRAVENOUS
Status: DISCONTINUED | OUTPATIENT
Start: 2021-07-21 | End: 2021-07-21

## 2021-07-21 RX ORDER — MIDAZOLAM HYDROCHLORIDE 1 MG/ML
INJECTION INTRAMUSCULAR; INTRAVENOUS
Status: DISCONTINUED | OUTPATIENT
Start: 2021-07-21 | End: 2021-07-21

## 2021-07-21 RX ORDER — LIDOCAINE HYDROCHLORIDE 10 MG/ML
1 INJECTION, SOLUTION EPIDURAL; INFILTRATION; INTRACAUDAL; PERINEURAL ONCE
Status: DISCONTINUED | OUTPATIENT
Start: 2021-07-21 | End: 2021-07-21 | Stop reason: HOSPADM

## 2021-07-21 RX ORDER — SODIUM CHLORIDE 0.9 G/100ML
IRRIGANT IRRIGATION
Status: DISCONTINUED | OUTPATIENT
Start: 2021-07-21 | End: 2021-07-21 | Stop reason: HOSPADM

## 2021-07-21 RX ADMIN — HYDROMORPHONE HYDROCHLORIDE 0.2 MG: 2 INJECTION INTRAMUSCULAR; INTRAVENOUS; SUBCUTANEOUS at 01:07

## 2021-07-21 RX ADMIN — KETOROLAC TROMETHAMINE 15 MG: 30 INJECTION, SOLUTION INTRAMUSCULAR; INTRAVENOUS at 04:07

## 2021-07-21 RX ADMIN — EPHEDRINE SULFATE 15 MG: 50 INJECTION INTRAVENOUS at 10:07

## 2021-07-21 RX ADMIN — PROPOFOL 180 MG: 10 INJECTION, EMULSION INTRAVENOUS at 10:07

## 2021-07-21 RX ADMIN — LIDOCAINE HYDROCHLORIDE 80 MG: 20 INJECTION, SOLUTION INTRAVENOUS at 10:07

## 2021-07-21 RX ADMIN — BUPIVACAINE HYDROCHLORIDE 15 ML: 5 INJECTION, SOLUTION EPIDURAL; INTRACAUDAL; PERINEURAL at 02:07

## 2021-07-21 RX ADMIN — MIDAZOLAM HYDROCHLORIDE 2 MG: 1 INJECTION, SOLUTION INTRAMUSCULAR; INTRAVENOUS at 09:07

## 2021-07-21 RX ADMIN — PHENYLEPHRINE HYDROCHLORIDE 100 MCG: 10 INJECTION INTRAVENOUS at 10:07

## 2021-07-21 RX ADMIN — FENTANYL CITRATE 50 MCG: 50 INJECTION, SOLUTION INTRAMUSCULAR; INTRAVENOUS at 10:07

## 2021-07-21 RX ADMIN — FENTANYL CITRATE 25 MCG: 50 INJECTION, SOLUTION INTRAMUSCULAR; INTRAVENOUS at 12:07

## 2021-07-21 RX ADMIN — PROPOFOL 70 MG: 10 INJECTION, EMULSION INTRAVENOUS at 12:07

## 2021-07-21 RX ADMIN — FENTANYL CITRATE 50 MCG: 50 INJECTION, SOLUTION INTRAMUSCULAR; INTRAVENOUS at 11:07

## 2021-07-21 RX ADMIN — GLYCOPYRROLATE 0.2 MG: 0.2 INJECTION, SOLUTION INTRAMUSCULAR; INTRAVITREAL at 10:07

## 2021-07-21 RX ADMIN — HYDROMORPHONE HYDROCHLORIDE 0.2 MG: 2 INJECTION INTRAMUSCULAR; INTRAVENOUS; SUBCUTANEOUS at 02:07

## 2021-07-21 RX ADMIN — DEXAMETHASONE SODIUM PHOSPHATE 4 MG: 4 INJECTION, SOLUTION INTRAMUSCULAR; INTRAVENOUS at 10:07

## 2021-07-21 RX ADMIN — FENTANYL CITRATE 100 MCG: 50 INJECTION, SOLUTION INTRAMUSCULAR; INTRAVENOUS at 10:07

## 2021-07-21 RX ADMIN — SODIUM CHLORIDE, SODIUM LACTATE, POTASSIUM CHLORIDE, AND CALCIUM CHLORIDE: .6; .31; .03; .02 INJECTION, SOLUTION INTRAVENOUS at 08:07

## 2021-07-21 RX ADMIN — FENTANYL CITRATE 25 MCG: 50 INJECTION, SOLUTION INTRAMUSCULAR; INTRAVENOUS at 10:07

## 2021-07-21 RX ADMIN — PROPOFOL 50 MG: 10 INJECTION, EMULSION INTRAVENOUS at 12:07

## 2021-07-21 RX ADMIN — FENTANYL CITRATE 25 MCG: 50 INJECTION, SOLUTION INTRAMUSCULAR; INTRAVENOUS at 11:07

## 2021-07-21 RX ADMIN — PROPOFOL 100 MG: 10 INJECTION, EMULSION INTRAVENOUS at 10:07

## 2021-07-21 RX ADMIN — ONDANSETRON 4 MG: 2 INJECTION, SOLUTION INTRAMUSCULAR; INTRAVENOUS at 10:07

## 2021-07-21 RX ADMIN — SODIUM CHLORIDE, SODIUM LACTATE, POTASSIUM CHLORIDE, AND CALCIUM CHLORIDE: .6; .31; .03; .02 INJECTION, SOLUTION INTRAVENOUS at 11:07

## 2021-07-21 RX ADMIN — ACETAMINOPHEN 1000 MG: 1000 INJECTION, SOLUTION INTRAVENOUS at 04:07

## 2021-07-21 RX ADMIN — EPHEDRINE SULFATE 20 MG: 50 INJECTION INTRAVENOUS at 10:07

## 2021-07-21 RX ADMIN — CEFAZOLIN SODIUM 2 G: 2 SOLUTION INTRAVENOUS at 10:07

## 2021-07-28 ENCOUNTER — OFFICE VISIT (OUTPATIENT)
Dept: DERMATOLOGY | Facility: CLINIC | Age: 51
End: 2021-07-28
Payer: MEDICARE

## 2021-07-28 DIAGNOSIS — Z76.89 ENCOUNTER FOR SKIN CARE: ICD-10-CM

## 2021-07-28 DIAGNOSIS — F98.8 NAIL BITING: ICD-10-CM

## 2021-07-28 DIAGNOSIS — L60.3 NAIL DYSTROPHY: Primary | ICD-10-CM

## 2021-07-28 PROCEDURE — 99999 PR PBB SHADOW E&M-EST. PATIENT-LVL III: CPT | Mod: PBBFAC,,, | Performed by: DERMATOLOGY

## 2021-07-28 PROCEDURE — 99999 PR PBB SHADOW E&M-EST. PATIENT-LVL III: ICD-10-PCS | Mod: PBBFAC,,, | Performed by: DERMATOLOGY

## 2021-07-28 PROCEDURE — 99213 OFFICE O/P EST LOW 20 MIN: CPT | Mod: S$PBB,,, | Performed by: DERMATOLOGY

## 2021-07-28 PROCEDURE — 99213 OFFICE O/P EST LOW 20 MIN: CPT | Mod: PBBFAC,PN | Performed by: DERMATOLOGY

## 2021-07-28 PROCEDURE — 99213 PR OFFICE/OUTPT VISIT, EST, LEVL III, 20-29 MIN: ICD-10-PCS | Mod: S$PBB,,, | Performed by: DERMATOLOGY

## 2021-07-28 RX ORDER — AMMONIUM LACTATE 12 G/100G
CREAM TOPICAL
Qty: 140 G | Refills: 0 | Status: SHIPPED | OUTPATIENT
Start: 2021-07-28

## 2021-08-04 ENCOUNTER — PATIENT MESSAGE (OUTPATIENT)
Dept: ADMINISTRATIVE | Facility: OTHER | Age: 51
End: 2021-08-04

## 2021-08-05 ENCOUNTER — OFFICE VISIT (OUTPATIENT)
Dept: ORTHOPEDICS | Facility: CLINIC | Age: 51
End: 2021-08-05
Payer: MEDICARE

## 2021-08-05 VITALS
SYSTOLIC BLOOD PRESSURE: 122 MMHG | WEIGHT: 212.94 LBS | DIASTOLIC BLOOD PRESSURE: 80 MMHG | BODY MASS INDEX: 27.33 KG/M2 | HEIGHT: 74 IN | RESPIRATION RATE: 18 BRPM | OXYGEN SATURATION: 96 % | HEART RATE: 88 BPM

## 2021-08-05 DIAGNOSIS — S52.532A CLOSED COLLES' FRACTURE OF LEFT RADIUS, INITIAL ENCOUNTER: Primary | ICD-10-CM

## 2021-08-05 PROCEDURE — 99999 PR PBB SHADOW E&M-EST. PATIENT-LVL V: CPT | Mod: PBBFAC,,, | Performed by: ORTHOPAEDIC SURGERY

## 2021-08-05 PROCEDURE — 99999 PR PBB SHADOW E&M-EST. PATIENT-LVL V: ICD-10-PCS | Mod: PBBFAC,,, | Performed by: ORTHOPAEDIC SURGERY

## 2021-08-05 PROCEDURE — 99024 PR POST-OP FOLLOW-UP VISIT: ICD-10-PCS | Mod: POP,,, | Performed by: ORTHOPAEDIC SURGERY

## 2021-08-05 PROCEDURE — 99024 POSTOP FOLLOW-UP VISIT: CPT | Mod: POP,,, | Performed by: ORTHOPAEDIC SURGERY

## 2021-08-05 PROCEDURE — 99215 OFFICE O/P EST HI 40 MIN: CPT | Mod: PBBFAC,PN | Performed by: ORTHOPAEDIC SURGERY

## 2021-08-05 RX ORDER — SULFAMETHOXAZOLE AND TRIMETHOPRIM 800; 160 MG/1; MG/1
1 TABLET ORAL 2 TIMES DAILY
Qty: 14 TABLET | Refills: 0 | Status: SHIPPED | OUTPATIENT
Start: 2021-08-05 | End: 2021-08-12

## 2021-08-12 ENCOUNTER — OFFICE VISIT (OUTPATIENT)
Dept: ORTHOPEDICS | Facility: CLINIC | Age: 51
End: 2021-08-12
Payer: MEDICARE

## 2021-08-12 VITALS
OXYGEN SATURATION: 96 % | WEIGHT: 212 LBS | DIASTOLIC BLOOD PRESSURE: 100 MMHG | SYSTOLIC BLOOD PRESSURE: 138 MMHG | RESPIRATION RATE: 18 BRPM | HEART RATE: 93 BPM | BODY MASS INDEX: 27.21 KG/M2 | HEIGHT: 74 IN | TEMPERATURE: 99 F

## 2021-08-12 DIAGNOSIS — S52.532A CLOSED COLLES' FRACTURE OF LEFT RADIUS, INITIAL ENCOUNTER: Primary | ICD-10-CM

## 2021-08-12 PROCEDURE — 99024 PR POST-OP FOLLOW-UP VISIT: ICD-10-PCS | Mod: POP,,, | Performed by: ORTHOPAEDIC SURGERY

## 2021-08-12 PROCEDURE — 99999 PR PBB SHADOW E&M-EST. PATIENT-LVL V: CPT | Mod: PBBFAC,,, | Performed by: ORTHOPAEDIC SURGERY

## 2021-08-12 PROCEDURE — 99215 OFFICE O/P EST HI 40 MIN: CPT | Mod: PBBFAC,PN | Performed by: ORTHOPAEDIC SURGERY

## 2021-08-12 PROCEDURE — 99999 PR PBB SHADOW E&M-EST. PATIENT-LVL V: ICD-10-PCS | Mod: PBBFAC,,, | Performed by: ORTHOPAEDIC SURGERY

## 2021-08-12 PROCEDURE — 99024 POSTOP FOLLOW-UP VISIT: CPT | Mod: POP,,, | Performed by: ORTHOPAEDIC SURGERY

## 2021-09-13 DIAGNOSIS — M25.532 LEFT WRIST PAIN: Primary | ICD-10-CM

## 2021-09-14 ENCOUNTER — OFFICE VISIT (OUTPATIENT)
Dept: ORTHOPEDICS | Facility: CLINIC | Age: 51
End: 2021-09-14
Payer: MEDICARE

## 2021-09-14 VITALS
RESPIRATION RATE: 18 BRPM | DIASTOLIC BLOOD PRESSURE: 98 MMHG | SYSTOLIC BLOOD PRESSURE: 138 MMHG | HEIGHT: 74 IN | BODY MASS INDEX: 27.73 KG/M2 | WEIGHT: 216.06 LBS | HEART RATE: 85 BPM | OXYGEN SATURATION: 97 %

## 2021-09-14 DIAGNOSIS — S52.532A CLOSED COLLES' FRACTURE OF LEFT RADIUS, INITIAL ENCOUNTER: Primary | ICD-10-CM

## 2021-09-14 PROCEDURE — 99024 POSTOP FOLLOW-UP VISIT: CPT | Mod: POP,,, | Performed by: ORTHOPAEDIC SURGERY

## 2021-09-14 PROCEDURE — 99024 PR POST-OP FOLLOW-UP VISIT: ICD-10-PCS | Mod: POP,,, | Performed by: ORTHOPAEDIC SURGERY

## 2021-09-14 PROCEDURE — 99214 OFFICE O/P EST MOD 30 MIN: CPT | Mod: PBBFAC,PN | Performed by: ORTHOPAEDIC SURGERY

## 2021-09-14 PROCEDURE — 99999 PR PBB SHADOW E&M-EST. PATIENT-LVL IV: CPT | Mod: PBBFAC,,, | Performed by: ORTHOPAEDIC SURGERY

## 2021-09-14 PROCEDURE — 99999 PR PBB SHADOW E&M-EST. PATIENT-LVL IV: ICD-10-PCS | Mod: PBBFAC,,, | Performed by: ORTHOPAEDIC SURGERY

## 2021-09-21 ENCOUNTER — CLINICAL SUPPORT (OUTPATIENT)
Dept: REHABILITATION | Facility: HOSPITAL | Age: 51
End: 2021-09-21
Attending: ORTHOPAEDIC SURGERY
Payer: MEDICARE

## 2021-09-21 DIAGNOSIS — S52.532A CLOSED COLLES' FRACTURE OF LEFT RADIUS, INITIAL ENCOUNTER: Primary | ICD-10-CM

## 2021-09-21 DIAGNOSIS — R29.898 DECREASED GRIP STRENGTH OF LEFT HAND: ICD-10-CM

## 2021-09-21 DIAGNOSIS — M25.632 DECREASED RANGE OF MOTION OF LEFT WRIST: ICD-10-CM

## 2021-09-21 DIAGNOSIS — Z78.9 IMPAIRED INSTRUMENTAL ACTIVITIES OF DAILY LIVING (IADL): ICD-10-CM

## 2021-09-21 DIAGNOSIS — M25.532 ACUTE PAIN OF LEFT WRIST: ICD-10-CM

## 2021-09-21 DIAGNOSIS — L90.5 SCAR: ICD-10-CM

## 2021-09-21 DIAGNOSIS — R29.898 DECREASED PINCH STRENGTH: ICD-10-CM

## 2021-09-21 PROCEDURE — 97110 THERAPEUTIC EXERCISES: CPT | Mod: PN

## 2021-09-21 PROCEDURE — 97166 OT EVAL MOD COMPLEX 45 MIN: CPT | Mod: PN

## 2021-09-28 ENCOUNTER — CLINICAL SUPPORT (OUTPATIENT)
Dept: REHABILITATION | Facility: HOSPITAL | Age: 51
End: 2021-09-28
Attending: ORTHOPAEDIC SURGERY
Payer: MEDICARE

## 2021-09-28 DIAGNOSIS — M25.532 ACUTE PAIN OF LEFT WRIST: ICD-10-CM

## 2021-09-28 DIAGNOSIS — L90.5 SCAR: ICD-10-CM

## 2021-09-28 DIAGNOSIS — Z78.9 IMPAIRED INSTRUMENTAL ACTIVITIES OF DAILY LIVING (IADL): ICD-10-CM

## 2021-09-28 DIAGNOSIS — M25.632 DECREASED RANGE OF MOTION OF LEFT WRIST: ICD-10-CM

## 2021-09-28 DIAGNOSIS — R29.898 DECREASED GRIP STRENGTH OF LEFT HAND: ICD-10-CM

## 2021-09-28 DIAGNOSIS — S52.532A CLOSED COLLES' FRACTURE OF LEFT RADIUS, INITIAL ENCOUNTER: Primary | ICD-10-CM

## 2021-09-28 DIAGNOSIS — R29.898 DECREASED PINCH STRENGTH: ICD-10-CM

## 2021-09-28 PROCEDURE — 97110 THERAPEUTIC EXERCISES: CPT | Mod: PN

## 2021-09-28 PROCEDURE — 97022 WHIRLPOOL THERAPY: CPT | Mod: PN

## 2021-09-28 PROCEDURE — 97035 APP MDLTY 1+ULTRASOUND EA 15: CPT | Mod: PN

## 2021-09-28 PROCEDURE — 97140 MANUAL THERAPY 1/> REGIONS: CPT | Mod: PN

## 2021-10-19 ENCOUNTER — CLINICAL SUPPORT (OUTPATIENT)
Dept: REHABILITATION | Facility: HOSPITAL | Age: 51
End: 2021-10-19
Attending: ORTHOPAEDIC SURGERY
Payer: MEDICARE

## 2021-10-19 DIAGNOSIS — R29.898 DECREASED PINCH STRENGTH: ICD-10-CM

## 2021-10-19 DIAGNOSIS — M25.532 ACUTE PAIN OF LEFT WRIST: ICD-10-CM

## 2021-10-19 DIAGNOSIS — L90.5 SCAR: ICD-10-CM

## 2021-10-19 DIAGNOSIS — Z78.9 IMPAIRED INSTRUMENTAL ACTIVITIES OF DAILY LIVING (IADL): ICD-10-CM

## 2021-10-19 DIAGNOSIS — M25.632 DECREASED RANGE OF MOTION OF LEFT WRIST: ICD-10-CM

## 2021-10-19 DIAGNOSIS — R29.898 DECREASED GRIP STRENGTH OF LEFT HAND: ICD-10-CM

## 2021-10-19 DIAGNOSIS — S52.532A CLOSED COLLES' FRACTURE OF LEFT RADIUS, INITIAL ENCOUNTER: Primary | ICD-10-CM

## 2021-10-19 PROCEDURE — 97530 THERAPEUTIC ACTIVITIES: CPT | Mod: PN

## 2021-10-19 PROCEDURE — 95851 RANGE OF MOTION MEASUREMENTS: CPT | Mod: PN

## 2021-10-26 DIAGNOSIS — M25.532 LEFT WRIST PAIN: Primary | ICD-10-CM

## 2021-10-27 ENCOUNTER — OFFICE VISIT (OUTPATIENT)
Dept: ORTHOPEDICS | Facility: CLINIC | Age: 51
End: 2021-10-27
Payer: MEDICARE

## 2021-10-27 VITALS
HEART RATE: 92 BPM | RESPIRATION RATE: 18 BRPM | OXYGEN SATURATION: 96 % | TEMPERATURE: 98 F | WEIGHT: 222.38 LBS | HEIGHT: 74 IN | BODY MASS INDEX: 28.54 KG/M2

## 2021-10-27 DIAGNOSIS — S52.532A CLOSED COLLES' FRACTURE OF LEFT RADIUS, INITIAL ENCOUNTER: ICD-10-CM

## 2021-10-27 DIAGNOSIS — S52.502A CLOSED FRACTURE OF DISTAL END OF LEFT RADIUS, UNSPECIFIED FRACTURE MORPHOLOGY, INITIAL ENCOUNTER: Primary | ICD-10-CM

## 2021-10-27 PROCEDURE — 99999 PR PBB SHADOW E&M-EST. PATIENT-LVL IV: CPT | Mod: PBBFAC,,, | Performed by: ORTHOPAEDIC SURGERY

## 2021-10-27 PROCEDURE — 99024 PR POST-OP FOLLOW-UP VISIT: ICD-10-PCS | Mod: POP,,, | Performed by: ORTHOPAEDIC SURGERY

## 2021-10-27 PROCEDURE — 99999 PR PBB SHADOW E&M-EST. PATIENT-LVL IV: ICD-10-PCS | Mod: PBBFAC,,, | Performed by: ORTHOPAEDIC SURGERY

## 2021-10-27 PROCEDURE — 99024 POSTOP FOLLOW-UP VISIT: CPT | Mod: POP,,, | Performed by: ORTHOPAEDIC SURGERY

## 2021-10-27 PROCEDURE — 99214 OFFICE O/P EST MOD 30 MIN: CPT | Mod: PBBFAC,PN | Performed by: ORTHOPAEDIC SURGERY

## 2022-01-24 DIAGNOSIS — S52.502A CLOSED FRACTURE OF DISTAL END OF LEFT RADIUS, UNSPECIFIED FRACTURE MORPHOLOGY, INITIAL ENCOUNTER: Primary | ICD-10-CM

## 2022-01-25 ENCOUNTER — OFFICE VISIT (OUTPATIENT)
Dept: ORTHOPEDICS | Facility: CLINIC | Age: 52
End: 2022-01-25
Attending: ORTHOPAEDIC SURGERY
Payer: MEDICARE

## 2022-01-25 VITALS
WEIGHT: 229.5 LBS | DIASTOLIC BLOOD PRESSURE: 79 MMHG | OXYGEN SATURATION: 99 % | SYSTOLIC BLOOD PRESSURE: 125 MMHG | RESPIRATION RATE: 18 BRPM | HEIGHT: 74 IN | BODY MASS INDEX: 29.45 KG/M2

## 2022-01-25 DIAGNOSIS — S52.502A CLOSED FRACTURE OF DISTAL END OF LEFT RADIUS, UNSPECIFIED FRACTURE MORPHOLOGY, INITIAL ENCOUNTER: Primary | ICD-10-CM

## 2022-01-25 PROCEDURE — 99999 PR PBB SHADOW E&M-EST. PATIENT-LVL IV: ICD-10-PCS | Mod: PBBFAC,,, | Performed by: ORTHOPAEDIC SURGERY

## 2022-01-25 PROCEDURE — 99214 OFFICE O/P EST MOD 30 MIN: CPT | Mod: PBBFAC,PN | Performed by: ORTHOPAEDIC SURGERY

## 2022-01-25 PROCEDURE — 99212 OFFICE O/P EST SF 10 MIN: CPT | Mod: S$PBB,,, | Performed by: ORTHOPAEDIC SURGERY

## 2022-01-25 PROCEDURE — 99999 PR PBB SHADOW E&M-EST. PATIENT-LVL IV: CPT | Mod: PBBFAC,,, | Performed by: ORTHOPAEDIC SURGERY

## 2022-01-25 PROCEDURE — 99212 PR OFFICE/OUTPT VISIT, EST, LEVL II, 10-19 MIN: ICD-10-PCS | Mod: S$PBB,,, | Performed by: ORTHOPAEDIC SURGERY

## 2022-01-25 NOTE — PROGRESS NOTES
"Postoperative Visit    History of Present Illness:    Moreno is 6 months s/p ORIF left distal radius and ulna   (DOS-7/21/21)  Felling better overall   Still has some sharp pain over radial wrist/hand. Pain with extremes of flexion and extension   Numbness resolved    Physical Examination:    Vitals:    01/25/22 0827   BP: 125/79   Resp: 18   SpO2: 99%   Weight: 104.1 kg (229 lb 8 oz)   Height: 6' 2" (1.88 m)   PainSc:   3     NAD  left upper extremity:  Incisions over the wrist well healed  FROM digits  Stiffness improving - flexion 70, ext 60, nearly symmetric to CL extremity  NTTP DR and DU  Otherwise ltsi   2+ RP  + EPL, IO, FDS, FDP     Imaging: 3 views of the L wrist show no change in DR fracture position, healed.  Ulnar styloid screw in place, + osteolysis of the styloid (unchanged)    Assessment/Plan:  51 y.o. male  6 months s/p ORIF left distal radius and ulna   (DOS-7/21/21)    Plan  1. Activity as tolerated  2. RTC PRN     All questions were answered in detail. The patient is in full agreement with the treatment plan and will proceed accordingly.        "

## 2022-01-28 DIAGNOSIS — E78.5 HYPERLIPIDEMIA, UNSPECIFIED HYPERLIPIDEMIA TYPE: ICD-10-CM

## 2022-01-28 NOTE — TELEPHONE ENCOUNTER
Care Due:                  Date            Visit Type   Department     Provider  --------------------------------------------------------------------------------                                [facility]      [facility]  MEDICINE/INTERN  Last Visit: 03-      FICE VISIT   [facility]         Cristina Maher  Next Visit: None Scheduled  None         None Found                                                            Last  Test          Frequency    Reason                     Performed    Due Date  --------------------------------------------------------------------------------    Office Visit  12 months..  citalopram, rosuvastatin,   03- 03-                             traZODone................    CMP.........  12 months..  rosuvastatin.............  Not Found    Overdue    Lipid Panel.  12 months..  rosuvastatin.............  Not Found    Overdue    Powered by [employer] by [employer]. Reference number: [id number].   1/28/2022 1:40:13 AM CST

## 2022-02-14 RX ORDER — ROSUVASTATIN CALCIUM 40 MG/1
TABLET, COATED ORAL
Qty: 90 TABLET | Refills: 3 | Status: SHIPPED | OUTPATIENT
Start: 2022-02-14

## 2022-04-25 ENCOUNTER — PATIENT OUTREACH (OUTPATIENT)
Dept: ADMINISTRATIVE | Facility: HOSPITAL | Age: 52
End: 2022-04-25
Payer: MEDICARE

## 2022-04-25 ENCOUNTER — TELEPHONE (OUTPATIENT)
Dept: ADMINISTRATIVE | Facility: HOSPITAL | Age: 52
End: 2022-04-25
Payer: MEDICARE

## 2022-04-25 ENCOUNTER — PATIENT MESSAGE (OUTPATIENT)
Dept: ADMINISTRATIVE | Facility: HOSPITAL | Age: 52
End: 2022-04-25
Payer: MEDICARE

## 2022-04-25 NOTE — PROGRESS NOTES
LM for patient to call clinic back. Patient needs to schedule annual visit. Portal message sent.

## 2022-04-26 NOTE — TELEPHONE ENCOUNTER
no longer reside in [place], [place]. I moved to [place], [place]. My new PCM is Dr. Cullen Aguirre, [facility]      Chart updated

## 2022-06-28 ENCOUNTER — PES CALL (OUTPATIENT)
Dept: ADMINISTRATIVE | Facility: CLINIC | Age: 52
End: 2022-06-28
Payer: MEDICARE

## (undated) DEVICE — BLANKET LOWER BODY 55.9X40.2IN

## (undated) DEVICE — DRAPE PLASTIC U 60X72

## (undated) DEVICE — Device

## (undated) DEVICE — CORD FOR BIPOLAR FORCEPS 12

## (undated) DEVICE — COVER OVERHEAD SURG LT BLUE

## (undated) DEVICE — SYR BULB 60CC IRRIGATION

## (undated) DEVICE — PAD CAST SPECIALIST STRL 4

## (undated) DEVICE — DRIVER, AO CONNECTION, SQUARE TIP 2.0MM

## (undated) DEVICE — SUT ETHILON 3-0 PS2 18 BLK

## (undated) DEVICE — GAUZE SPONGE 4X4 12PLY

## (undated) DEVICE — BLADE SURG STAINLESS STEEL #15

## (undated) DEVICE — SEE MEDLINE ITEM 146345

## (undated) DEVICE — GLOVE BIOGEL PI MICRO SZ 6.5

## (undated) DEVICE — UNDERGLOVE BIOGEL PI SZ 6.5 LF

## (undated) DEVICE — APPLICATOR CHLORAPREP ORN 26ML

## (undated) DEVICE — SEE MEDLINE ITEM 157110

## (undated) DEVICE — SUT VICRYL 3-0 27 SH

## (undated) DEVICE — SLING ORTHOPEDIC MEDIUM

## (undated) DEVICE — SUT ETHILON 3-0 BLK MONO FS

## (undated) DEVICE — SOL 9P NACL IRR PIC IL

## (undated) DEVICE — SEE MEDLINE ITEM 152622

## (undated) DEVICE — SEE MEDLINE ITEM 157173

## (undated) DEVICE — DRILL, SOLID SIDE CUTTING, 2.5MM X 40MM

## (undated) DEVICE — SEE MEDLINE ITEM 146292

## (undated) DEVICE — SEE MEDLINE ITEM 107746

## (undated) DEVICE — DRIVER, UNIVERSAL QUICK CONNECT, T10

## (undated) DEVICE — CANISTER SUCTION 2 LTR

## (undated) DEVICE — DRAPE SURG W/TWL 17 5/8X23

## (undated) DEVICE — DRILL, SOLID SIDE CUTTING, 2.0MM X 40MM

## (undated) DEVICE — SEE MEDLINE ITEM 146308

## (undated) DEVICE — SUT FIBERWIRE

## (undated) DEVICE — DRAPE C-ARM FOR MOBILE XRAY

## (undated) DEVICE — ELECTRODE REM PLYHSV RETURN 9

## (undated) DEVICE — PAD PREP 50/CA

## (undated) DEVICE — TOURNIQUET SB QC DP 18X4IN